# Patient Record
Sex: MALE | Race: WHITE | Employment: FULL TIME | URBAN - METROPOLITAN AREA
[De-identification: names, ages, dates, MRNs, and addresses within clinical notes are randomized per-mention and may not be internally consistent; named-entity substitution may affect disease eponyms.]

---

## 2017-05-25 ENCOUNTER — TELEPHONE (OUTPATIENT)
Dept: CARDIOLOGY CLINIC | Age: 62
End: 2017-05-25

## 2017-06-14 RX ORDER — MEXILETINE HYDROCHLORIDE 200 MG/1
CAPSULE ORAL
Qty: 180 CAPSULE | Refills: 0 | Status: SHIPPED | OUTPATIENT
Start: 2017-06-14 | End: 2017-08-22 | Stop reason: SDUPTHER

## 2017-07-17 ENCOUNTER — PROCEDURE VISIT (OUTPATIENT)
Dept: CARDIOLOGY CLINIC | Age: 62
End: 2017-07-17

## 2017-07-17 ENCOUNTER — OFFICE VISIT (OUTPATIENT)
Dept: CARDIOLOGY CLINIC | Age: 62
End: 2017-07-17

## 2017-07-17 VITALS
BODY MASS INDEX: 35.25 KG/M2 | HEIGHT: 73 IN | WEIGHT: 266 LBS | DIASTOLIC BLOOD PRESSURE: 78 MMHG | SYSTOLIC BLOOD PRESSURE: 126 MMHG | HEART RATE: 66 BPM

## 2017-07-17 DIAGNOSIS — R00.2 PALPITATIONS: Chronic | ICD-10-CM

## 2017-07-17 DIAGNOSIS — G47.30 SLEEP APNEA, UNSPECIFIED TYPE: ICD-10-CM

## 2017-07-17 DIAGNOSIS — I47.29 PAROXYSMAL VT: Primary | ICD-10-CM

## 2017-07-17 DIAGNOSIS — I25.10 CORONARY ARTERY DISEASE INVOLVING NATIVE CORONARY ARTERY OF NATIVE HEART WITHOUT ANGINA PECTORIS: Chronic | ICD-10-CM

## 2017-07-17 DIAGNOSIS — Z45.09 ENCOUNTER FOR ELECTRONIC ANALYSIS OF REVEAL EVENT RECORDER: Chronic | ICD-10-CM

## 2017-07-17 PROCEDURE — 93291 INTERROG DEV EVAL SCRMS IP: CPT | Performed by: INTERNAL MEDICINE

## 2017-07-17 PROCEDURE — 99214 OFFICE O/P EST MOD 30 MIN: CPT | Performed by: INTERNAL MEDICINE

## 2017-08-28 RX ORDER — MEXILETINE HYDROCHLORIDE 200 MG/1
CAPSULE ORAL
Qty: 180 CAPSULE | Refills: 3 | Status: SHIPPED | OUTPATIENT
Start: 2017-08-28 | End: 2018-09-26 | Stop reason: SDUPTHER

## 2017-11-20 RX ORDER — CLOPIDOGREL BISULFATE 75 MG/1
TABLET ORAL
Qty: 30 TABLET | Refills: 11 | Status: SHIPPED | OUTPATIENT
Start: 2017-11-20 | End: 2022-06-17 | Stop reason: HOSPADM

## 2017-11-20 RX ORDER — METOPROLOL SUCCINATE 50 MG/1
TABLET, EXTENDED RELEASE ORAL
Qty: 90 TABLET | Refills: 3 | Status: SHIPPED | OUTPATIENT
Start: 2017-11-20 | End: 2018-11-21 | Stop reason: SDUPTHER

## 2018-04-25 ENCOUNTER — OFFICE VISIT (OUTPATIENT)
Dept: CARDIOLOGY CLINIC | Age: 63
End: 2018-04-25

## 2018-04-25 VITALS
WEIGHT: 263 LBS | SYSTOLIC BLOOD PRESSURE: 130 MMHG | DIASTOLIC BLOOD PRESSURE: 80 MMHG | HEART RATE: 76 BPM | BODY MASS INDEX: 34.85 KG/M2 | HEIGHT: 73 IN

## 2018-04-25 DIAGNOSIS — I48.0 PAROXYSMAL ATRIAL FIBRILLATION (HCC): ICD-10-CM

## 2018-04-25 DIAGNOSIS — E78.5 HYPERLIPIDEMIA, UNSPECIFIED HYPERLIPIDEMIA TYPE: ICD-10-CM

## 2018-04-25 DIAGNOSIS — G47.30 SLEEP APNEA, UNSPECIFIED TYPE: ICD-10-CM

## 2018-04-25 DIAGNOSIS — I25.10 CORONARY ARTERY DISEASE INVOLVING NATIVE CORONARY ARTERY OF NATIVE HEART WITHOUT ANGINA PECTORIS: Primary | Chronic | ICD-10-CM

## 2018-04-25 PROCEDURE — 99214 OFFICE O/P EST MOD 30 MIN: CPT | Performed by: INTERNAL MEDICINE

## 2018-04-25 RX ORDER — SIMVASTATIN 20 MG
20 TABLET ORAL
COMMUNITY
Start: 2018-04-13 | End: 2018-04-25 | Stop reason: SDUPTHER

## 2018-04-25 RX ORDER — SIMVASTATIN 40 MG
40 TABLET ORAL DAILY
Qty: 30 TABLET | Refills: 6 | Status: SHIPPED | OUTPATIENT
Start: 2018-04-25 | End: 2020-02-10

## 2018-09-10 ENCOUNTER — PROCEDURE VISIT (OUTPATIENT)
Dept: CARDIOLOGY CLINIC | Age: 63
End: 2018-09-10

## 2018-09-10 ENCOUNTER — OFFICE VISIT (OUTPATIENT)
Dept: CARDIOLOGY CLINIC | Age: 63
End: 2018-09-10

## 2018-09-10 VITALS
BODY MASS INDEX: 34.19 KG/M2 | DIASTOLIC BLOOD PRESSURE: 83 MMHG | SYSTOLIC BLOOD PRESSURE: 130 MMHG | WEIGHT: 258 LBS | HEIGHT: 73 IN | HEART RATE: 51 BPM

## 2018-09-10 DIAGNOSIS — I25.119 CORONARY ARTERY DISEASE INVOLVING NATIVE CORONARY ARTERY OF NATIVE HEART WITH ANGINA PECTORIS (HCC): ICD-10-CM

## 2018-09-10 DIAGNOSIS — I47.29 NSVT (NONSUSTAINED VENTRICULAR TACHYCARDIA): Primary | ICD-10-CM

## 2018-09-10 DIAGNOSIS — G47.33 OSA (OBSTRUCTIVE SLEEP APNEA): ICD-10-CM

## 2018-09-10 DIAGNOSIS — Z45.09 ENCOUNTER FOR ELECTRONIC ANALYSIS OF REVEAL EVENT RECORDER: Primary | ICD-10-CM

## 2018-09-10 PROCEDURE — 93000 ELECTROCARDIOGRAM COMPLETE: CPT | Performed by: INTERNAL MEDICINE

## 2018-09-10 PROCEDURE — 99214 OFFICE O/P EST MOD 30 MIN: CPT | Performed by: INTERNAL MEDICINE

## 2018-09-10 NOTE — PATIENT INSTRUCTIONS
you think you are having a problem with your medicine. When should you call for help? Call 911 anytime you think you may need emergency care. For example, call if:    · You passed out (lost consciousness).    Call your doctor now or seek immediate medical care if:    · You are dizzy or lightheaded, or you feel like you may faint.     · You are short of breath.    Watch closely for changes in your health, and be sure to contact your doctor if you have any problems. Where can you learn more? Go to [email or web address] and sign in to your [employer] account. Enter O941 in the [employer] box to learn more about \"[facility]: Care Instructions. \"     If you do not have an account, please click on the \"Sign Up Now\" link. Current as of: December 6, 2017  Content Version: 11.7  © 3404-3327 [employer]. Care instructions adapted under license by [facility] ([facility]). If you have questions about a medical condition or this instruction, always ask your healthcare professional. [address] any warranty or liability for your use of this information.

## 2018-09-10 NOTE — PROGRESS NOTES
hives  · Hematological and Lymphatic ROS: negative for - bleeding problems, blood clots, bruising or jaundice  · Endocrine ROS: negative for - skin changes, temperature intolerance or unexpected weight changes  · Respiratory ROS: negative for - cough, sputum, wheezing sleep apnea  · Cardiovascular ROS: Per HPI. · Gastrointestinal ROS: negative for - abdominal pain, diarrhea, nausea/vomiting, bleeding   · Genito-Urinary ROS: negative for - dysuria or incontinence  · Musculoskeletal ROS: back pain  · Neurological ROS: negative for - confusion, numbness/tingling, seizures, weakness  · Dermatological ROS: negative for - rash    Physical Examination:  Vitals:    09/10/18 0918   BP: 130/83   Pulse: 51       · Constitutional: Oriented. No distress. Reddish facial skin heavy  · Head:  atraumatic. · Eyes: Conjunctivae normal.   · Neck:  Neck supple. · Cardiovascular: Normal rate, regular rhythm, S1&S2 without murmur, gallop, or rub. · Pulmonary/Chest: Bilateral respiratory sounds. No wheezes   · Abdominal: Soft. Bowel sounds present. No distension, No tenderness. · Musculoskeletal: No tenderness. No edema    · Neurological: Alert and oriented. No Gross deficit   · Skin: Skin is warm and dry. No rash noted. · Psychiatric: Has a normal mood, affect and behavior         Left pectoral IRL implant site intact     ECG: Sinus rhythm, 54 bpm, frequent VE    11/2012 Echo: Normal contraction of a normal-sized left ventricle with  an estimated ejection fraction by visual inspection of 55%. No  specific abnormality of interventricular septal motion. Mitral and  tricuspid regurgitation. The right ventricular systolic pressure is  21 mmHg. This is a technically suboptimal echo and subtle wall  motion changes might not be apparent on this study. 12/2012 MRI: There is no evidence of any delayed myocardial enhancement. No abnormal wall motion or contractility is identified.  The left ventricular ejection fraction is calculated at 41% however visually it is greater than 55%. The abnormal calculated ejection fraction is attributed to the difficulty with cardiac gating of the pulse sequences. Assessment and plan:   Patient Active Problem List    Diagnosis Date Noted    Sleep apnea 11/11/2016     Priority: Low    Paroxysmal atrial fibrillation ([address] 09/11/2015     Priority: Low    Encounter for electronic analysis of reveal event recorder 02/14/2014     Priority: Low    Palpitations 07/26/2013     Priority: Low    CAD (coronary artery disease) 11/30/2012     Priority: Low    V-tach ([address] 11/30/2012     Priority: Low    Paroxysmal VT ([address] 11/25/2012     Priority: Low    Chest pain 11/25/2012     Priority: Low    Ileus ([facility] 09/02/2012     Priority: Low     Plan:    - Non-sustained VT:    - There is one 12 lead ECG(11/25/12) of his NSVT which showed a NSVT with RBBB morphology, positive precordial concordance, inferior axis( L2 and L3 equal height), negative aVR and aVL, small qR pattern in V-1 consistent with A-M continuity location. - ECG from [facility] also reveals Bigeminy with the same QRS morphology as described. 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 - Amiodarone was stopped after labs showed hyperthyroidism  6/22/15 - TSH= . 02. Repeat - TSH 1.77 from 8/17/15                - He was started on Mexiletine 200 mg twice a day in Sept. 2015. 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 LVgram - normal with EF 55%.  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 Small-moderate sized inferolateral partial reversibility defect consistent with ischemia and infarction of mid  LCx and/or RCA-EF 57%    - Follows with Dr. Wicho Hunt   - On Coumadin for coronary ectasia followed by Dr. Wicho Hunt     -  Sleep apnea   - Uses CPAP, but does use it nightly due to intermittent sinus congestion. He feels good when able to use it. Plan:  1. Continue Mexiletine & metoprolol as scheduled. 2.  Return for regular follow up in one year with device check  3. CAD follow up with Dr. Meredith Campos attestation: This note was scribed in the presence of Andrew Chapin M.D. by Vasu Jeetr RN.      Physician Attestation: I, Dr. Andrew Chapin, confirm that the scribe's documentation has been prepared under my direction and personally reviewed by me in its entirety. I also confirm that the note above accurately reflects all work, treatment, procedures, and medical decision making performed by me. Karla Sanchez.  MD Jayshree

## 2018-09-26 RX ORDER — MEXILETINE HYDROCHLORIDE 200 MG/1
CAPSULE ORAL
Qty: 180 CAPSULE | Refills: 3 | Status: SHIPPED | OUTPATIENT
Start: 2018-09-26 | End: 2020-01-03 | Stop reason: SDUPTHER

## 2018-11-21 RX ORDER — METOPROLOL SUCCINATE 50 MG/1
TABLET, EXTENDED RELEASE ORAL
Qty: 90 TABLET | Refills: 3 | Status: SHIPPED | OUTPATIENT
Start: 2018-11-21 | End: 2021-11-01 | Stop reason: SDUPTHER

## 2019-02-15 ENCOUNTER — TELEPHONE (OUTPATIENT)
Dept: CARDIOLOGY CLINIC | Age: 64
End: 2019-02-15

## 2020-01-03 ENCOUNTER — TELEPHONE (OUTPATIENT)
Dept: CARDIOLOGY CLINIC | Age: 65
End: 2020-01-03

## 2020-01-03 RX ORDER — MEXILETINE HYDROCHLORIDE 200 MG/1
200 CAPSULE ORAL 2 TIMES DAILY
Qty: 180 CAPSULE | Refills: 0 | Status: SHIPPED | OUTPATIENT
Start: 2020-01-03 | End: 2020-02-10 | Stop reason: SDUPTHER

## 2020-01-03 NOTE — TELEPHONE ENCOUNTER
Filled a 3 month supply.  Please schedule him for a follow up either with [facility] or [facility] or [facility] within the next three months since he did not have his yearly follow up

## 2020-01-03 NOTE — TELEPHONE ENCOUNTER
Medication Refill    Medication needing refilled:MEXILETINE    Doseage of the medication:    How are you taking this medication (QD, BID, TID, QID, PRN):    30 or 90 day supply called in:    Which Pharmacy are we sending the medication to?:  [facility]   NEEDS TODAY , [address] [facility]

## 2020-01-30 NOTE — PROGRESS NOTES
Michelle Terrazas MD   simvastatin (ZOCOR) 40 MG tablet Take 1 tablet by mouth daily  Corey Barber MD   clopidogrel (PLAVIX) 75 MG tablet TAKE 1 TABLET DAILY  Corey Barber MD   warfarin (COUMADIN) 5 MG tablet 1-2 tabs po daily as directed by PT/INR  Corey Barber MD   lisinopril-hydrochlorothiazide (PRINZIDE;ZESTORETIC) 10-12.5 MG per tablet Take 1 tablet by mouth daily. Historical Provider, MD      Past Medical History:  Past Medical History:   Diagnosis Date    Acid reflux 1996    Surgey to have stomach wrapped, unable to reguritat    [facility]     Arrhythmia 11/25/2012    runs of vtach    Hyperlipidemia     on fish oil    Hypertension     MI (myocardial infarction) ([facility]     x2     Past Surgical History:    has a past surgical history that includes hernia repair; Umbilical hernia repair; and Tonsillectomy. Social History:  Reviewed. reports that he has quit smoking. He smoked 0.20 packs per day. He has never used smokeless tobacco. He reports that he does not drink alcohol. Family History:  Reviewed. family history includes Other in his father and sister. Denies family history of sudden cardiac death, arrhythmia, premature CAD    Review of System:  · Constitutional: No fevers, chills, weight changes or weakness  · HEENT: No visual changes. No mouth sores or sore throat. · Cardiovascular: denies chest pain, denies dyspnea on exertion, denies palpitations or denies loss of consciousness. No cough, hemoptysis, denies pleuritic pain, or phlebitis. · Respiratory: denies cough or wheezing. No hematemesis. · Gastrointestinal: No abdominal pain, blood in stools. · Genitourinary: No dysuria, urgency or hematuria. · Musculoskeletal: denies gait disturbance, No muscle weakness. · Integumentary: No rash or pruritis. · Neurological: No headache, change in muscle strength, numbness or tingling. · Psychiatric: No confusion, anxiety, or depression.   · Endocrine: No temperature intolerance. No excessive thirst, fluid intake, or urination. · Hem/Lymph: No abnormal bruising or bleeding, blood clots or swollen lymph nodes. Physical Examination:  There were no vitals filed for this visit. Wt Readings from Last 3 Encounters:   09/10/18 258 lb (117 kg)   04/25/18 263 lb (119.3 kg)   07/17/17 266 lb (120.7 kg)       Constitutional: Cooperative and in no apparent distress, and appears well nourished  Skin: Warm and pink; no pallor, cyanosis, bruising, or clubbing  HEENT: Symmetric and normocephalic. PERRL, EOM intact. Conjunctiva pink with clear sclera. Mucus membranes pink and moist. Teeth intact. Thyroid smooth without nodules or goiter  Respiratory: Respirations symmetric and unlabored. Lungs clear to auscultation bilaterally, no wheezing, rhonchi, or crackles  Cardiovascular:  regular rate and rhythm. S1 & S2 present without murmurs, rubs, or gallops. Peripheral pulses 2+, capillary refill < 3 seconds. negative elevation of JVP. No peripheral edema  Gastrointestinal: Abdomen soft and round. Bowel sounds normoactive in all quadrants without tenderness or masses. Musculoskeletal: Bilateral upper and lower extremity strength 5/5 with full ROM. Neurological/Psych: Awake and orientated to person, place and time. Calm affect, appropriate mood. Pertinent labs, diagnostic, device, and imaging results reviewed as a part of this visit    LABS    CBC:   Lab Results   Component Value Date    WBC 6.7 01/23/2015    HGB 13.9 11/26/2012    HCT 42.4 11/26/2012    MCV 98.1 11/26/2012     01/23/2015     BMP:   Lab Results   Component Value Date    CREATININE 1.0 11/26/2012    BUN 18 11/26/2012     11/26/2012    K 4.5 11/26/2012     11/26/2012    CO2 28 11/26/2012     CrCl cannot be calculated (Patient's most recent lab result is older than the maximum 120 days allowed. ).    Lab Results   Component Value Date    BNP 25 11/25/2012       Thyroid:   Lab Results   Component Value Date TSH 2.44 2013    K9ZTVYZ 1.12 2015     Lipid Panel:   Lab Results   Component Value Date    CHOL 173 2012    HDL 37 2012    TRIG 188 2012     LFTs:  Lab Results   Component Value Date    ALT 32 2015    AST 31 2015    ALKPHOS 59 2015    BILITOT 0.4 2015     Coags:   Lab Results   Component Value Date    PROTIME 42.1 (H) 2019    INR 3.69 (H) 2019    APTT 29.7 2012     EC2020 Sinus bradycardia HR 57, , QRS 98, QTc 413    Echo: 2012  IMPRESSION- Normal contraction of a normal-sized left ventricle with  an estimated ejection fraction by visual inspection of 55%. No  specific abnormality of interventricular septal motion.  Mitral and  tricuspid regurgitation.  The right ventricular systolic pressure is  21 mmHg.  This is a technically suboptimal echo and subtle wall  motion changes might not be apparent on this study. GXT: 1/20/15  Summary    Small sized anteroapical partial reversibility defect consistent with    ischemia and infarction in the territory of the distal LAD .    Small-moderate sized inferolateral partial reversibility defect consistent    with ischemia and infarction in the territory of the mid LCx and/or RCA .    Left ventricular ejection fraction of 57 %. Cardiac MRI: 12  IMPRESSION:           1. Normal cardiac MRI with normal cardiac chamber size, contractility, and wall motion. 2. No evidence of any myocardial scarring. 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 98     Aortogram - done for suspected AAA. Large AAA infrarenal with lumen 2X size of aorta and eccentric to the left.     Assessment:  NSVT/PVC- chronic   - ECG today shows SB no ectopy   - S/p ILR 13 has met therapy: yes  6. Patient with history of CHF and atrial fibrillation on anticoagulation: N/A     I have addressed the patient's cardiac risk factors and adjusted pharmacologic treatment as needed. In addition, I have reinforced the need for patient directed risk factor modification. I independently reviewed the ECG    All questions and concerns were addressed with the patient. Alternatives to treatment were discussed. Thank you for allowing to us to participate in the care of [facility].     ANTON Alvarado-CNP  [address]   Office: [phone]

## 2020-02-10 ENCOUNTER — OFFICE VISIT (OUTPATIENT)
Dept: CARDIOLOGY CLINIC | Age: 65
End: 2020-02-10
Payer: COMMERCIAL

## 2020-02-10 ENCOUNTER — NURSE ONLY (OUTPATIENT)
Dept: CARDIOLOGY CLINIC | Age: 65
End: 2020-02-10

## 2020-02-10 VITALS
BODY MASS INDEX: 34.33 KG/M2 | HEIGHT: 73 IN | SYSTOLIC BLOOD PRESSURE: 154 MMHG | HEART RATE: 64 BPM | WEIGHT: 259 LBS | DIASTOLIC BLOOD PRESSURE: 90 MMHG

## 2020-02-10 PROCEDURE — 99213 OFFICE O/P EST LOW 20 MIN: CPT | Performed by: NURSE PRACTITIONER

## 2020-02-10 PROCEDURE — 93000 ELECTROCARDIOGRAM COMPLETE: CPT | Performed by: NURSE PRACTITIONER

## 2020-02-10 RX ORDER — MEXILETINE HYDROCHLORIDE 200 MG/1
200 CAPSULE ORAL 2 TIMES DAILY
Qty: 180 CAPSULE | Refills: 0 | Status: SHIPPED | OUTPATIENT
Start: 2020-02-10 | End: 2020-10-09

## 2020-02-11 DIAGNOSIS — Z79.01 ON WARFARIN THERAPY: ICD-10-CM

## 2020-02-11 LAB
HCT VFR BLD CALC: 43.4 % (ref 40.5–52.5)
HCT VFR BLD CALC: 44.3 % (ref 40.5–52.5)
HEMOGLOBIN: 14.7 G/DL (ref 13.5–17.5)
HEMOGLOBIN: 14.7 G/DL (ref 13.5–17.5)
MCH RBC QN AUTO: 32.6 PG (ref 26–34)
MCH RBC QN AUTO: 33.4 PG (ref 26–34)
MCHC RBC AUTO-ENTMCNC: 33.3 G/DL (ref 31–36)
MCHC RBC AUTO-ENTMCNC: 34 G/DL (ref 31–36)
MCV RBC AUTO: 98.1 FL (ref 80–100)
MCV RBC AUTO: 98.3 FL (ref 80–100)
PDW BLD-RTO: 13.2 % (ref 12.4–15.4)
PDW BLD-RTO: 13.3 % (ref 12.4–15.4)
PLATELET # BLD: 219 K/UL (ref 135–450)
PLATELET # BLD: 226 K/UL (ref 135–450)
PMV BLD AUTO: 7.5 FL (ref 5–10.5)
PMV BLD AUTO: 7.5 FL (ref 5–10.5)
RBC # BLD: 4.41 M/UL (ref 4.2–5.9)
RBC # BLD: 4.51 M/UL (ref 4.2–5.9)
WBC # BLD: 5.2 K/UL (ref 4–11)
WBC # BLD: 5.2 K/UL (ref 4–11)

## 2020-10-09 RX ORDER — MEXILETINE HYDROCHLORIDE 200 MG/1
CAPSULE ORAL
Qty: 180 CAPSULE | Refills: 0 | Status: SHIPPED | OUTPATIENT
Start: 2020-10-09 | End: 2020-10-27 | Stop reason: SDUPTHER

## 2020-10-09 NOTE — TELEPHONE ENCOUNTER
Received refill request for Mexitil from [facility].      Last OV: 02/10/2020    Last Labs: 2020     Last EK/10/2020    Last Refill: 02/10/2020 #180 w/ 0 refills    Next Appointment: on recall list for appt on 2021

## 2020-10-27 RX ORDER — MEXILETINE HYDROCHLORIDE 150 MG/1
150 CAPSULE ORAL 2 TIMES DAILY
Qty: 180 CAPSULE | Refills: 1 | Status: SHIPPED | OUTPATIENT
Start: 2020-10-27 | End: 2021-05-18

## 2020-10-27 NOTE — TELEPHONE ENCOUNTER
Please call pharmacy and see if mexiletine 200 mg capsules remain on backorder? If they do then can reduce him to 150 mg bid. Also would like to call patient and notify him of dose change if symptoms worse he should call office and can increase to tid.      Jennifer Yin, APRN-CNP

## 2020-10-27 NOTE — TELEPHONE ENCOUNTER
Spoke with the pharmacy staff and they confirmed the Mexitil 200 was still on backorder. Called and advised the pt that we are going to change rx to Mexitil 150 mg 1 tab po BID per NPAL . Patient voiced understanding.

## 2021-03-18 ENCOUNTER — TELEPHONE (OUTPATIENT)
Dept: CARDIOLOGY CLINIC | Age: 66
End: 2021-03-18

## 2021-03-18 NOTE — TELEPHONE ENCOUNTER
He wants to get the covid vaccine . When trying to schedule appt to get the vaccine the pharmacist told him he should not take the Moderna brand vaccine because he takes mexiletine BID. Which brand does [address] [place] [address] think he should get , HOTELbeat or Go Kin Packs ?  LM if he doesn't answer

## 2021-03-18 NOTE — TELEPHONE ENCOUNTER
Spoke with Dr. Kamla Fay he is unaware of any contraindications in receiving any of the vaccines when taking Mexiletine and does not have any recommendations or which one to receive. Attempted to call patient no answer mailbox full unable to leave message.

## 2021-05-18 RX ORDER — MEXILETINE HYDROCHLORIDE 150 MG/1
CAPSULE ORAL
Qty: 180 CAPSULE | Refills: 1 | Status: SHIPPED | OUTPATIENT
Start: 2021-05-18

## 2021-05-18 NOTE — TELEPHONE ENCOUNTER
Received refill request for mexitil from [facility] pharmacy.     Last ov:2/10/2020 NPAL    Last labs:cmp 2/11/2020    Last Refill: 10/27/2020 #180 with 1 refill    Next appointment:NPAL on recall list

## 2021-08-20 ENCOUNTER — OFFICE VISIT (OUTPATIENT)
Dept: CARDIOLOGY CLINIC | Age: 66
End: 2021-08-20
Payer: COMMERCIAL

## 2021-08-20 VITALS
HEART RATE: 77 BPM | BODY MASS INDEX: 35.68 KG/M2 | DIASTOLIC BLOOD PRESSURE: 88 MMHG | WEIGHT: 269.2 LBS | HEIGHT: 73 IN | SYSTOLIC BLOOD PRESSURE: 158 MMHG | OXYGEN SATURATION: 96 %

## 2021-08-20 DIAGNOSIS — R00.0 TACHYCARDIA: ICD-10-CM

## 2021-08-20 DIAGNOSIS — I10 ESSENTIAL HYPERTENSION: ICD-10-CM

## 2021-08-20 DIAGNOSIS — E78.2 MIXED HYPERLIPIDEMIA: ICD-10-CM

## 2021-08-20 DIAGNOSIS — I25.10 CORONARY ARTERY DISEASE INVOLVING NATIVE CORONARY ARTERY OF NATIVE HEART WITHOUT ANGINA PECTORIS: Primary | ICD-10-CM

## 2021-08-20 PROCEDURE — 99214 OFFICE O/P EST MOD 30 MIN: CPT | Performed by: NURSE PRACTITIONER

## 2021-08-20 PROCEDURE — 93000 ELECTROCARDIOGRAM COMPLETE: CPT | Performed by: NURSE PRACTITIONER

## 2021-08-20 RX ORDER — MELATONIN
DAILY
COMMUNITY

## 2021-08-20 NOTE — PROGRESS NOTES
[facility]     Outpatient Follow Up Note    Mireya Wilkerson is 60 y.o. male who presents today with a history of non-obst disease of the LAD, NSVT/PVC, HTN and hyperlipidemia      CHIEF COMPLAINT / HPI:  Follow Up secondary to coronary artery disease    Subjective:   He denies significant chest pain. There is no SOB/SIERRA. The patient denies orthopnea/PND. He has chest pain and SOB when carrying sometime of wt up steps. The patient does not have swelling. The patients weight is up ~ 30# / year . The patient is not experiencing palpitations or dizziness. He has a little cough / seasonal sinus    If / when he has any palpitations he has lopressor to take in addition to his daily Toprol. He hasn't needed to take lopressor prn but when he needs to, the palpitations typically last a day or two. He can feel repetitive  skipped beats and sometimes SOB  His home BP runs ~ 130/80    These symptoms show no change since the last OV. With regard to medication therapy the patient has been compliant with prescribed regimen. They have tolerated therapy to date.      Past Medical History:   Diagnosis Date    Acid reflux 1996    Surgey to have stomach wrapped, unable to reguritat    [facility]     Arrhythmia 11/25/2012    runs of vtach    Hyperlipidemia     on fish oil    Hypertension     MI (myocardial infarction) [facility]     x2     Social History:    Social History     Tobacco Use   Smoking Status Former Smoker    Packs/day: 0.20   Smokeless Tobacco Never Used   Tobacco Comment    quit 3 yrs ago     Current Medications:  Current Outpatient Medications   Medication Sig Dispense Refill    Misc Natural Products (GLUCOSAMINE CHOND MSM FORMULA PO) Take 750 mg by mouth 2 times daily      Cholecalciferol (VITAMIN D3) 50 MCG (2000 UT) CAPS Take by mouth daily      Nutritional Supplements (CHLORELLA-SPIRULINA COMPLEX PO) Take 6 tablets by mouth 2 times daily      Flax OIL Take 1,650 mg by mouth 2 softgel in am, 1 in pm      mexiletine (MEXITIL) 150 MG capsule TAKE 1 CAPSULE BY MOUTH TWICE A  capsule 1    metoprolol succinate (TOPROL XL) 50 MG extended release tablet TAKE 1 TABLET DAILY 90 tablet 3    metoprolol tartrate (LOPRESSOR) 25 MG tablet 1 tab po as needed for palpitations 30 tablet 1    clopidogrel (PLAVIX) 75 MG tablet TAKE 1 TABLET DAILY 30 tablet 11    lisinopril-hydrochlorothiazide (PRINZIDE;ZESTORETIC) 10-12.5 MG per tablet Take 1 tablet by mouth daily. No current facility-administered medications for this visit. REVIEW OF SYSTEMS:    CONSTITUTIONAL: No major weight gain or loss, fatigue, weakness, night sweats or fever. HEENT: No new vision difficulties or ringing in the ears. RESPIRATORY: No new SOB, PND, orthopnea or cough. CARDIOVASCULAR: See HPI  GI: No nausea, vomiting, diarrhea, constipation, abdominal pain or changes in bowel habits. : No urinary frequency, urgency, incontinence hematuria or dysuria. SKIN: No cyanosis or skin lesions. MUSCULOSKELETAL: No new muscle or joint pain. NEUROLOGICAL: No syncope or TIA-like symptoms. PSYCHIATRIC: No anxiety, pain, insomnia or depression    Objective:   PHYSICAL EXAM:        Vitals:    08/20/21 1009 08/20/21 1039 08/20/21 1040   BP: 130/84 (!) 150/90 (!) 158/88   Site: Left Upper Arm Right Upper Arm Left Upper Arm   Position: Sitting     Cuff Size: Large Adult     Pulse: 77     SpO2: 96%     Weight: 269 lb 3.2 oz (122.1 kg)     Height: 6' 1\" (1.854 m)         VITALS:  /84 (Site: Left Upper Arm, Position: Sitting, Cuff Size: Large Adult)   Pulse 77   Ht 6' 1\" (1.854 m)   Wt 269 lb 3.2 oz (122.1 kg)   SpO2 96%   BMI 35.52 kg/m²   CONSTITUTIONAL: Cooperative, no apparent distress, and appears well nourished / over weight  NEUROLOGIC:  Awake and orientated to person, place and time. PSYCH: Calm affect. SKIN: Warm and dry.   HEENT: Sclera non-icteric, normocephalic, neck supple, no elevation of JVP, normal carotid pulses with no bruits and thyroid normal size. LUNGS:  No increased work of breathing and clear to auscultation, no crackles or wheezing  CARDIOVASCULAR:  Regular rate 68 and rhythm with no murmurs, gallops, rubs, or abnormal heart sounds, normal PMI. The apical impulses not displaced  JVP less than 8 cm H2O  Heart tones are crisp and normal  Cervical veins are not engorged  The carotid upstroke is normal in amplitude and contour without delay or bruit  JVP is not elevated  ABDOMEN:  Normal bowel sounds, non-distended and non-tender to palpation  EXT: No edema, no calf tenderness. Pulses are present bilaterally.     DATA:    Lab Results   Component Value Date    ALT 17 02/11/2020    AST 22 02/11/2020    ALKPHOS 64 02/11/2020    BILITOT 0.6 02/11/2020     Lab Results   Component Value Date    CREATININE 0.8 02/11/2020    BUN 13 02/11/2020     (L) 02/11/2020    K 5.4 (H) 02/11/2020    CL 97 (L) 02/11/2020    CO2 29 02/11/2020     Lab Results   Component Value Date    TSH 2.13 02/11/2020    T2HYMLY 1.12 06/22/2015     Lab Results   Component Value Date    WBC 5.2 02/11/2020    HGB 14.7 02/11/2020    HCT 44.3 02/11/2020    MCV 98.1 02/11/2020     02/11/2020     No components found for: CHLPL  Lab Results   Component Value Date    TRIG 188 (H) 11/26/2012     Lab Results   Component Value Date    HDL 32 (L) 02/11/2020    HDL 37 (L) 11/26/2012     Lab Results   Component Value Date    LDLCALC 108 (H) 02/11/2020    LDLCALC 99 11/26/2012     Lab Results   Component Value Date    LABVLDL 34 02/11/2020    LABVLDL 38 11/26/2012     LABS: 1/13/21:    trig 233 HDL 49    Na+ 136 K+ 4.4 chl 101 CO2 27 BUN 13 creatinine 0.96 glu 97    Radiology Review:  Pertinent images / reports were reviewed as a part of this visit and reveals the following:    Cardiac cath : [facility] '15:   Distal LM 40%  prox-LAD 40%  prox-CX 30%  Mid-RCA 60%    Last Stress Test: [facility] '15  Summary    Small sized anteroapical partial hypertension   ~controlled on arrival and by report home values  ~suboptimal with recheck  ~lisinopril-HCTZ / toprol    3. Tachycardia   ~hx of NS-VT / PVC  ~infrequent episode of palpitations ; does not use lopressor often.  ~remains on mexitil / metoprolol   ~K+ WNL EKG 12 lead   4. Mixed hyperlipidemia   ~LDL not at goal ; trig elevated  ~intolerant to atorvastatin with rash      I had the opportunity to review the clinical symptoms and presentation of Sam Pompa. Plan:     1. EKG : sinus rhythm 70  2. F/U in one year   Consider rosuvastatin : will d/w PCP    Overall the patient is stable from CV standpoint    I have addresed the patient's cardiac risk factors and adjusted pharmacologic treatment as needed. In addition, I have reinforced the need for patient directed risk factor modification. Further evaluation will be based upon the patient's clinical course and testing results. All questions and concerns were addressed to the patient. Alternatives to my treatment were discussed. The patient is not currently smoking. The risks related to smoking were reviewed with the patient. Recommend maintaining a smoke-free lifestyle. Patient is on a beta-blocker  Patient is on an ace-i/ARB  Patient is not on a statin : intolerant to atorvastatin      Antiplatelet therapy has been recommended / prescribed for this patient. Education conducted on adverse reactions including bleeding was discussed. The patient verbalizes understanding not to stop medications without discussing with us. Discussed exercise: 30-60 minutes 7 days/week  Discussed Low saturated fat diet. Thank you for allowing to us to participate in the care of Sam Pompa.     ANTON Riddle    Documentation of today's visit sent to PCP

## 2021-11-01 RX ORDER — METOPROLOL SUCCINATE 50 MG/1
50 TABLET, EXTENDED RELEASE ORAL DAILY
Qty: 90 TABLET | Refills: 3 | Status: SHIPPED | OUTPATIENT
Start: 2021-11-01 | End: 2021-11-23 | Stop reason: SDUPTHER

## 2021-11-01 NOTE — TELEPHONE ENCOUNTER
From: Kt Lee  To: Office of Roni Gavin MD  Sent: 11/1/2021 11:06 AM EDT  Subject: Medication Renewal Request    Refills have been requested for the following medications:     metoprolol succinate (TOPROL XL) 50 MG extended release tablet Roni Gavin MD]    Preferred pharmacy: [id number] - [address] [facility], [address].  [facility] [phone] - F [phone]

## 2021-11-17 RX ORDER — METOPROLOL SUCCINATE 50 MG/1
50 TABLET, EXTENDED RELEASE ORAL DAILY
Qty: 90 TABLET | Refills: 3 | OUTPATIENT
Start: 2021-11-17

## 2021-11-23 ENCOUNTER — TELEPHONE (OUTPATIENT)
Dept: CARDIOLOGY CLINIC | Age: 66
End: 2021-11-23

## 2021-11-23 RX ORDER — METOPROLOL SUCCINATE 50 MG/1
50 TABLET, EXTENDED RELEASE ORAL DAILY
Qty: 90 TABLET | Refills: 3 | Status: SHIPPED | OUTPATIENT
Start: 2021-11-23 | End: 2022-02-24

## 2021-11-23 NOTE — TELEPHONE ENCOUNTER
Medication Refill    Medication needing refilled:metoprolol     Dosage of the medication:    How are you taking this medication (QD, BID, TID, QID, PRN):    30 or 90 day supply called in:    When will you run out of your medication: out of med x 2 days     Which Pharmacy are we sending the medication to?:  [facility] [address] [facility] [address] [place] [address] , HE IS HAVING PALPITATIONS

## 2022-02-24 ENCOUNTER — TELEPHONE (OUTPATIENT)
Dept: VASCULAR SURGERY | Age: 67
End: 2022-02-24

## 2022-02-24 ENCOUNTER — TELEPHONE (OUTPATIENT)
Dept: CARDIOLOGY CLINIC | Age: 67
End: 2022-02-24

## 2022-02-24 DIAGNOSIS — I71.40 ABDOMINAL AORTIC ANEURYSM (AAA) GREATER THAN 5.5 CM IN DIAMETER IN MALE: Primary | ICD-10-CM

## 2022-02-24 DIAGNOSIS — I72.4 POPLITEAL ARTERY ANEURYSM (HCC): ICD-10-CM

## 2022-02-24 RX ORDER — LISINOPRIL AND HYDROCHLOROTHIAZIDE 12.5; 1 MG/1; MG/1
1 TABLET ORAL 2 TIMES DAILY
Qty: 90 TABLET | Refills: 1
Start: 2022-02-24

## 2022-02-24 RX ORDER — METOPROLOL SUCCINATE 50 MG/1
50 TABLET, EXTENDED RELEASE ORAL 2 TIMES DAILY
Qty: 90 TABLET | Refills: 3
Start: 2022-02-24 | End: 2022-03-11

## 2022-02-24 NOTE — TELEPHONE ENCOUNTER
AIM Auth code for CTA with runoff: [id number]  2-24-22 to 3-4-22    Scheduled for CTA with runoff on 2-28-22 arrive to [facility] at 8:25 am for 8:40 am testing. Remain NPO 4 hours prior. Patient informed of times and instructions. Verbalized understanding.       He is scheduled with Dr Jeni Cordova at 9:45 am and with Dr Destin Tineo at 1:45 pm.

## 2022-02-24 NOTE — TELEPHONE ENCOUNTER
Nikita Marvin RN is calling in on behalf of Dr. Diya Angela stating that he is wanting to talk to Dr. Mike Augustin Cancer cell phone is [phone]

## 2022-02-25 ENCOUNTER — TELEPHONE (OUTPATIENT)
Dept: ADMINISTRATIVE | Age: 67
End: 2022-02-25

## 2022-02-25 DIAGNOSIS — I71.40 ABDOMINAL AORTIC ANEURYSM (AAA) GREATER THAN 5.5 CM IN DIAMETER IN MALE: Primary | ICD-10-CM

## 2022-02-25 DIAGNOSIS — I72.4 POPLITEAL ARTERY ANEURYSM (HCC): ICD-10-CM

## 2022-02-25 NOTE — TELEPHONE ENCOUNTER
Central Scheduling scheduled the patient for an CTA Chest Abdomen and the order in the patients chart states the same but the authorization is only for a CTA Abdomen. Please confirm which CTA the patient is supposed to have done.

## 2022-02-25 NOTE — PROGRESS NOTES
Cardiology Consultation     Bony Caldwell  1955    PCP: Inés Phillips MD  Referring Physician: Dr. Yossi Mcrae   Reason for Referral: uncontrolled HTN, AAA     Chief Complaint:   Chief Complaint   Patient presents with    Follow-up     AAA referral from oncologist       Subjective:     History of Present Illness: The patient is 73 y.o. male with a past medical history significant for who presents with NSVT/PVC, GERD, HLD, HTN, former smoker, sleep apea, non-obstructive CAD-Dr. Yashira Carrasco with plans for CTA chest/abd/pelvis prior to f/u as well as consultation with Dr. Lory Beck. Today, presents per his ONC, completed imaging prior to this appt and prior to consultation per Dr. Cecy Trevizo today. BP is stable today with doubling lisinopril and toprol-XL. Per patient, ONC prefers stent vs. Surgery due to healing time. Due to current medication per ONC, he has to pace himself walking. Stops due to left chest and pain prior to resuming. Patient denies exertional chest pain/pressure, dyspnea at rest, SIERRA, PND, orthopnea, palpitations, lightheadedness, weight changes, changes in LE edema, and syncope.  Denies abnormal      Past Medical History:   Diagnosis Date    Acid reflux 1996    Surgey to have stomach wrapped, unable to reguritat    Aortic aneurysm ([address]     Arrhythmia 11/25/2012    runs of vtach    Hyperlipidemia     on fish oil    Hypertension     [facility]) [address] [address]     x2     Past Surgical History:   Procedure Laterality Date    HERNIA REPAIR      TONSILLECTOMY      UMBILICAL HERNIA REPAIR       Family History   Problem Relation Age of Onset    Other Father     Other Sister     Heart Disease Neg Hx      Social History     Tobacco Use    Smoking status: Former Smoker     Packs/day: 0.20    Smokeless tobacco: Never Used    Tobacco comment: quit 3 yrs ago   Vaping Use    Vaping Use: Not on file   Substance Use Topics    Alcohol use: No    Drug use: Not on file Allergies   Allergen Reactions    Lipitor [Atorvastatin] Other (See Comments)     Current Outpatient Medications   Medication Sig Dispense Refill    lisinopril-hydroCHLOROthiazide (PRINZIDE;ZESTORETIC) 10-12.5 MG per tablet Take 1 tablet by mouth 2 times daily 90 tablet 1    metoprolol succinate (TOPROL XL) 50 MG extended release tablet Take 1 tablet by mouth 2 times daily 90 tablet 3    Misc Natural Products (GLUCOSAMINE CHOND MSM FORMULA PO) Take 750 mg by mouth 2 times daily      Cholecalciferol (VITAMIN D3) 50 MCG (2000 UT) CAPS Take by mouth daily      Nutritional Supplements (CHLORELLA-SPIRULINA COMPLEX PO) Take 6 tablets by mouth 2 times daily      Flax OIL Take 1,650 mg by mouth 2 softgel in am, 1 in pm      metoprolol tartrate (LOPRESSOR) 25 MG tablet 1 tab po as needed for palpitations 30 tablet 1    mexiletine (MEXITIL) 150 MG capsule TAKE 1 CAPSULE BY MOUTH TWICE A  capsule 1    clopidogrel (PLAVIX) 75 MG tablet TAKE 1 TABLET DAILY 30 tablet 11     No current facility-administered medications for this visit. Review of Systems:  · Constitutional: No unanticipated weight loss. There's been no change in energy level, sleep pattern, or activity level. No fevers, chills. · Eyes: No visual changes or diplopia. No scleral icterus. · ENT: No Headaches, hearing loss or vertigo. No mouth sores or sore throat. · Cardiovascular: as reviewed in HPI  · Respiratory: No cough or wheezing, no sputum production. No hemoptysis. · Gastrointestinal: No abdominal pain, appetite loss, blood in stools. No change in bowel or bladder habits. · Genitourinary: No dysuria, trouble voiding, or hematuria. · Musculoskeletal:  No gait disturbance, no joint complaints. · Integumentary: No rash or pruritis. · Neurological: No headache, diplopia, change in muscle strength, numbness or tingling. · Psychiatric: No anxiety or depression. · Endocrine: No temperature intolerance.  No excessive thirst, fluid intake, or urination. No tremor. · Hematologic/Lymphatic: No abnormal bruising or bleeding, blood clots or swollen lymph nodes. · Allergic/Immunologic: No nasal congestion or hives. Physical Exam:   /84   Pulse 65   Ht 6' 1\" (1.854 m)   Wt 269 lb (122 kg)   SpO2 100%   BMI 35.49 kg/m²   Wt Readings from Last 3 Encounters:   02/28/22 269 lb (122 kg)   08/20/21 269 lb 3.2 oz (122.1 kg)   02/10/20 259 lb (117.5 kg)     Constitutional: He is oriented to person, place, and time. He appears well-developed and well-nourished. In no acute distress. Head: Normocephalic and atraumatic. Pupils equal and round. Neck: Neck supple. No JVP or carotid bruit appreciated. No mass and no thyromegaly present. No lymphadenopathy present. Cardiovascular: Normal rate. Normal heart sounds. Exam reveals no gallop and no friction rub. No murmur heard. Pulmonary/Chest: Effort normal and breath sounds normal. No respiratory distress. He has no wheezes, rhonchi or rales. Abdominal: Soft, non-tender. Bowel sounds are normal. He exhibits no organomegaly, mass or bruit. Extremities: No edema. No cyanosis or clubbing. Pulses are 2+ radial/carotid bilaterally. Neurological: No gross cranial nerve deficit. Coordination normal.   Skin: Skin is warm and dry. There is no rash or diaphoresis. Psychiatric: He has a normal mood and affect.  His speech is normal and behavior is normal.     Lab Review:    Lab Results   Component Value Date    TRIG 188 11/26/2012    HDL 32 02/11/2020    LDLCALC 108 02/11/2020    LABVLDL 34 02/11/2020     Lab Results   Component Value Date    BUN 13 02/11/2020    CREATININE 0.8 02/11/2020     Lab Results   Component Value Date/Time    TROPONINI <0.01 11/26/2012 06:45 AM    TROPONINI 0.00 11/25/2012 04:17 AM    LABA1C 5.6 02/11/2020 09:51 AM      No results found for: CBCAUTODIF    EKG Interpretation: 3/1/22 not completed    Echo: 11/2012  IMPRESSION- Normal contraction of a normal-sized left ventricle with   an estimated ejection fraction by visual inspection of 55%. No   specific abnormality of interventricular septal motion.  Mitral and   tricuspid regurgitation.  The right ventricular systolic pressure is   21 mmHg.  This is a technically suboptimal echo and subtle wall   motion changes might not be apparent on this study. Stress Test: 2015  Small sized anteroapical partial reversibility defect consistent with    ischemia and infarction in the territory of the distal LAD .    Small-moderate sized inferolateral partial reversibility defect consistent    with ischemia and infarction in the territory of the mid LCx and/or RCA .    Left ventricular ejection fraction of 57 %.         Cardiac MRI:1/2013     FINDINGS: No cardiac chamber enlargement is identified. No pericardial effusion is identified. No axillary, mediastinal or hilar lymphadenopathy is identified. No pleural   effusions are    identified. The thoracic aorta is without evidence of any aneurysm. No valvular stenosis or    regurgitation is identified.           There is no evidence of any delayed myocardial enhancement. No abnormal wall motion or    contractility is identified. The left ventricular ejection fraction is calculated at 41%    however visually it is greater than 55%. The abnormal calculated ejection fraction is    attributed to the difficulty with cardiac gating of the pulse sequences. 1. Normal cardiac MRI with normal cardiac chamber size, contractility, and wall motion. 2. No evidence of any myocardial scarring. Cath: Angiogram 1/23/15: LVEDP - 15. LVgram - normal with EF 55%. 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 The bone windows show no acute or focal abnormality.       The liver demonstrates diffusely decreased attenuation suggestive   of fatty infiltration. No focal hepatic lesions are seen. The   gallbladder, adrenal glands, spleen, and pancreas are   unremarkable.       Multiple exophytic bilateral renal cortical lesions are identified   most compatible with cysts. The small and large bowel show normal   caliber and wall thickness. The appendix appears normal. There is   scattered colonic diverticula noted with no CT evidence of active   inflammation. There is  dilation of the infrarenal abdominal aorta   measuring up to 3.2 x 3.0 cm in dimension.       No free fluid or adenopathy is seen. CTA abd pel w con: 3/2015     Coronary arterial calcifications are noted. The visualized lung   bases are clear. Bone windows show no acute or focal abnormality.       The liver, spleen, pancreas, and kidneys enhance homogeneously   with the exception of multiple bilateral renal cortical cysts. The   adrenal glands and gallbladder show no abnormality. Evaluation of   the bowel is limited due to a lack of administration of oral   contrast. No dilated loops of bowel are identified. The appendix   appears normal. There are a few scattered colonic diverticula   which show no evidence of active inflammation.       There is an infrarenal abdominal aortic aneurysm which measures up   to 3.4 x 3.5 cm, slightly increased in size since the prior study   at which time it measured 3.2 x 3.1 cm at the same level. The   common iliac arteries are nondilated.       No free fluid or adenopathy is seen.      CTA chest abd aorta with runoff: 2/28/22    All above diagnostic testing and laboratory data was independently visualized and reviewed by me (not simply review of report)       Assessment and Plan   1) Infrarenal abdominal aortic aneurysm  -1st imaged 2012  -referral per Dr. Moira Mondragon for uncontrolled HTN and AAA  -CTA chest/abd/aorta w runoff today 2/28/22  -referral to Dr. Andi Hunt as well after this visit. -current medical therapy includes prinzide, metoprolol succinate and Plavix.   -intolerance to Lipitor     2) non-obstructive CAD-Dr. Bethel Schrader  -Denies angina  -Reviewed all imaging     3) HLD, unspecified  -LDLc 108 2/2020, 149 2/2022    4) HTN, essential   -BP stable. -BMP abnormal 2/2022  -Continue Toprol-XL 50 mg BID and prinzide 10-12.5 mg BID     5) Former smoker  Encouraged life long smoking cessation. 6) sleep apnea  Sleeps propped up due to back pain  Not using sleep machine     7) NSVT/PVC//AFIB/palpitati  ons   Managed per Dr. 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 We will plan follow up in [facility] in 6 months. Thank you very much for allowing me to participate in the care of your patient. Please do not hesitate to contact me if you have any questions. Jesica Marlow MD [address] [facility], Interventional Cardiology, and Peripheral Vascular Disease   [place] [address]   Ph: [phone]  Fax: [phone]    This note was scribed in the presence of Dr. Greta Kaplan MD by Cierra Cerna, RN. Physician Attestation:  The scribes documentation has been prepared under my direction and personally reviewed by me in its entirety. I confirm the note above accurately reflects all work, treatment, procedures, and medical decision making performed by me.     Electronically signed by Arleen Mcgill MD on 3/7/2022 at 10:37 AM

## 2022-02-28 ENCOUNTER — HOSPITAL ENCOUNTER (OUTPATIENT)
Dept: CT IMAGING | Age: 67
Discharge: HOME | End: 2022-02-28
Payer: COMMERCIAL

## 2022-02-28 ENCOUNTER — OFFICE VISIT (OUTPATIENT)
Dept: CARDIOLOGY CLINIC | Age: 67
End: 2022-02-28
Payer: COMMERCIAL

## 2022-02-28 ENCOUNTER — INITIAL CONSULT (OUTPATIENT)
Dept: VASCULAR SURGERY | Age: 67
End: 2022-02-28
Payer: COMMERCIAL

## 2022-02-28 ENCOUNTER — TELEPHONE (OUTPATIENT)
Dept: VASCULAR SURGERY | Age: 67
End: 2022-02-28

## 2022-02-28 VITALS
OXYGEN SATURATION: 100 % | WEIGHT: 269 LBS | DIASTOLIC BLOOD PRESSURE: 84 MMHG | HEART RATE: 65 BPM | HEIGHT: 73 IN | BODY MASS INDEX: 35.65 KG/M2 | SYSTOLIC BLOOD PRESSURE: 128 MMHG

## 2022-02-28 VITALS
SYSTOLIC BLOOD PRESSURE: 123 MMHG | HEART RATE: 81 BPM | BODY MASS INDEX: 35.78 KG/M2 | DIASTOLIC BLOOD PRESSURE: 86 MMHG | HEIGHT: 73 IN | WEIGHT: 270 LBS

## 2022-02-28 DIAGNOSIS — I71.40 ABDOMINAL AORTIC ANEURYSM (AAA) GREATER THAN 5.5 CM IN DIAMETER IN MALE: ICD-10-CM

## 2022-02-28 DIAGNOSIS — Z87.891 FORMER SMOKER: ICD-10-CM

## 2022-02-28 DIAGNOSIS — I71.40 ABDOMINAL AORTIC ANEURYSM (AAA) GREATER THAN 5.5 CM IN DIAMETER IN MALE: Primary | ICD-10-CM

## 2022-02-28 DIAGNOSIS — I71.43 ANEURYSM OF INFRARENAL ABDOMINAL AORTA: Primary | ICD-10-CM

## 2022-02-28 DIAGNOSIS — G47.30 SLEEP APNEA, UNSPECIFIED TYPE: ICD-10-CM

## 2022-02-28 DIAGNOSIS — E78.2 MIXED HYPERLIPIDEMIA: ICD-10-CM

## 2022-02-28 DIAGNOSIS — I10 ESSENTIAL HYPERTENSION: ICD-10-CM

## 2022-02-28 DIAGNOSIS — I72.4 POPLITEAL ARTERY ANEURYSM (HCC): ICD-10-CM

## 2022-02-28 DIAGNOSIS — I25.10 CORONARY ARTERY DISEASE INVOLVING NATIVE CORONARY ARTERY OF NATIVE HEART WITHOUT ANGINA PECTORIS: ICD-10-CM

## 2022-02-28 PROCEDURE — 75635 CT ANGIO ABDOMINAL ARTERIES: CPT

## 2022-02-28 PROCEDURE — 99204 OFFICE O/P NEW MOD 45 MIN: CPT | Performed by: STUDENT IN AN ORGANIZED HEALTH CARE EDUCATION/TRAINING PROGRAM

## 2022-02-28 PROCEDURE — 6360000004 HC RX CONTRAST MEDICATION: Performed by: STUDENT IN AN ORGANIZED HEALTH CARE EDUCATION/TRAINING PROGRAM

## 2022-02-28 PROCEDURE — 99204 OFFICE O/P NEW MOD 45 MIN: CPT | Performed by: INTERNAL MEDICINE

## 2022-02-28 RX ADMIN — IOPAMIDOL 75 ML: 755 INJECTION, SOLUTION INTRAVENOUS at 08:42

## 2022-02-28 ASSESSMENT — ENCOUNTER SYMPTOMS
ABDOMINAL PAIN: 0
SHORTNESS OF BREATH: 1
BACK PAIN: 0

## 2022-02-28 NOTE — PROGRESS NOTES
Ilsa Shukla (:  1955) is a 73 y.o. male,New patient, here for evaluation of the following chief complaint(s):  Consultation         ASSESSMENT/PLAN:  1. Abdominal aortic aneurysm (AAA) greater than 5.5 cm in diameter in male [facility])       This is a 69-year-old male patient presents the office to discuss a large abdominal aortic aneurysm. He has a complicated and distorted recent history of course. He has developed a new axillary mass that is concern for accelerated growth of a squamous cell carcinoma. There is some concern about its ability to metastasize and get worse. Therefore he is undergone a myriad of tests and scans in the interim. This is revealed that he has had enlargement of his abdominal aortic aneurysm. It is now close to 7 cm. This puts him in a rupture category of around 10% per jojo. Therefore he was referred for the treatment of this. 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 Denies any symptoms associated with either. As long as it is okay I think it is a good enough time to proceed with EVAR to treat the abdominal aortic aneurysm. I am also concerned about the left popliteal aneurysm repair. It is confluent and encompassing the adductor space therefore stenting is not ideal.  The vessel of course is also large and require least an 8 or 9 mm stent just to treat it. Once the AAA is treated with EVAR this precludes any up and over treatment options. Therefore antegrade with likely a cutdown would have to be done to treat this with a stent. If that is to be indicated then the better option would be to go ahead and proceed with bypass and exclusion of the aneurysm ideally. However of course the issue with that is if he is ever currently on chemotherapy here in the near future then wound healing will be slightly complicated.   However that being said the most important issue is the abdominal aortic aneurysm this is the most and significantly life-threatening issue. The left popliteal aneurysm can become a limb threatening issue at any given time as well. He has no signs of thromboembolic phenomenon. Once again he does not endorse any lower extremity symptoms. We will discuss with his cardiologist about the plans going forward. Once we get clearance we will proceed with EVAR. After that I presume that they will proceed to treat his cancer with I would presume surgery and chemo-rads. From there we can decide when the timing is to fix his popliteal artery aneurysm repair. Once again he can have a stent placed however it would have to be done likely not percutaneously but with an open femoral artery exposure just because the sheer size of sheath as it be difficult to get closure with antegrade puncture of a large 9 maybe 10 [place] sheath. All the risks benefits and alternatives were clearly reviewed for treating his abdominal aortic aneurysm. We will attempt to do this percutaneously. Once clearance is obtained and a time is obtained we will do this hopefully within the next 1 to 2 weeks. All questions answered. Subjective   SUBJECTIVE/OBJECTIVE:  This is a 59-year-old male patient presents the office today to discuss abdominal aortic aneurysm repair. He has a history that recently began in the last couple of months with discovery of a left axillary mass. The mass appears to be adjacent to lymph nodes. He has undergone biopsies which have demonstrated squamous cell carcinoma. The primary is yet to be elucidated. His team is certain that it something else unrelated. However in the conduct of conducting a PET scan may discover that he has an abdominal cardiac aneurysm that is grown significantly in size. He is to be around 3 cm around 5 years ago it is now closer to 7 cm. Patient denies abdominal or back pain. He denies any chest pain or shortness of breath.   He does have a history of coronary disease and is undergone arteriograms which have demonstrated what appears to be arteriomegaly with mild stenosis of his coronaries. He is supposed be taking warfarin and Plavix. He is taking neither at this time. He was taking Plavix but stopped that for what appears to be series of procedures planned in the near future. He had no idea that he had a popliteal aneurysm on the left side. He denies any ambulatory claudication symptoms. He does get short of breath with walking related to certain medication that he takes. He is planned to undergo bladder biopsy with possible evaluation for possible source here in the near future. Review of Systems   Constitutional: Negative for chills and fever. Respiratory: Positive for shortness of breath. Cardiovascular: Negative for chest pain and leg swelling. Gastrointestinal: Negative for abdominal pain. Musculoskeletal: Negative for back pain and myalgias. Skin: Negative for wound. Neurological: Negative for weakness. Hematological: Does not bruise/bleed easily. Psychiatric/Behavioral: Negative for agitation. Objective   Physical Exam  Cardiovascular:      Rate and Rhythm: Normal rate and regular rhythm. Pulses: Normal pulses. Pulmonary:      Effort: Pulmonary effort is normal. No respiratory distress. Abdominal:      Palpations: Abdomen is soft. Tenderness: There is no abdominal tenderness. Musculoskeletal:         General: Normal range of motion. Skin:     General: Skin is warm and dry. Capillary Refill: Capillary refill takes less than 2 seconds. Comments: Unable to palpate popliteal pulse behind knee   Neurological:      General: No focal deficit present. Mental Status: He is alert. Psychiatric:         Mood and Affect: Mood normal.         Behavior: Behavior normal.         Thought Content:  Thought content normal.         Judgment: Judgment normal.            On this date 2/28/2022 I have spent 50 minutes reviewing previous notes, test results and face to face with the patient discussing the diagnosis and importance of compliance with the treatment plan as well as documenting on the day of the visit. Qamar Ward DO, [address] [facility]    This document was dictated using voice recognition software.

## 2022-02-28 NOTE — TELEPHONE ENCOUNTER
Patient advised to begin ASA 81 mg per Dr Nikko Zapata and Dr Keller Denver. Pt verbalized understanding.

## 2022-03-01 ENCOUNTER — TELEPHONE (OUTPATIENT)
Dept: CARDIOLOGY CLINIC | Age: 67
End: 2022-03-01

## 2022-03-01 ENCOUNTER — TELEPHONE (OUTPATIENT)
Dept: VASCULAR SURGERY | Age: 67
End: 2022-03-01

## 2022-03-01 DIAGNOSIS — I71.40 ABDOMINAL AORTIC ANEURYSM (AAA) WITHOUT RUPTURE: Primary | ICD-10-CM

## 2022-03-01 RX ORDER — ASPIRIN 81 MG/1
81 TABLET ORAL DAILY
COMMUNITY

## 2022-03-01 NOTE — TELEPHONE ENCOUNTER
Dr. Wade Fairchild    Patient was seen in the office yesterday regarding HTN and AAA. He was then seen in the office with Dr. Deniz Grigsby who now plans to proceed with AAA repair on 3/4/22. Please advise regarding clearance.

## 2022-03-01 NOTE — PROGRESS NOTES
Covid testing to be done @ pt will go to [facility] on 3/2/22  If positive---Pt instructed to notify MD ASAP   If negative--pt was instructed to bring results DOP/DOS      Preoperative Screening for Elective Surgery/Invasive Procedures While COVID-19 present in the community     1. Have you tested positive or have been told to self-isolate for COVID-19 like symptoms within the past 28 days?no  2. Do you currently have any of the following symptoms?no  ? Fever >100.0 F or 99.9 F in immunocompromised patients?n  ? New onset cough, shortness of breath or difficulty breathing?n  ? New onset sore throat, myalgia (muscle aches and pains), headache, loss of taste/smell or diarrhea?n  3. Have you had a potential exposure to COVID-19 within the past 14 days by:no  ? Close contact with a confirmed case?n  ? Close contact with a healthcare worker,  or essential infrastructure worker (grocery store, [employer], gas station, public utilities or transportation)?no  ? Do you reside in a congregate setting such as; skilled nursing facility, adult home, correctional facility, homeless shelter or other institutional setting?n  ? Have you had recent travel to a known COVID-19 hotspot?n     Indicate if the patient has a positive screen by answering yes to one or more of the above questions. If patient is unable to obtain a COVID test prior to DOS/DOP will need RAPID DOS. C-Difficile admission screening and protocol:     * Admitted with diarrhea?n     *Prior history of C-Diff. In last 3 months?n     *Antibiotic use in the past 6-8 weeks?n     *Prior hospitalization or nursing home in the last month?n      SAFETY FIRST. .call before you fall  [address] [facility] time_______6        Surgery time________730    Take the following medications with a sip of water:    Do not eat or drink anything after 12:00 midnight prior to your surgery. This includes water chewing gum, mints and ice chips. You may brush your teeth and gargle the morning of your surgery, but do not swallow the water     Please see your family doctor/pediatrician for a history and physical and/or concerning medications. Bring any test results/reports from your physicians office. If you are under the care of a heart doctor or specialist doctor, please be aware that you may be asked to them for clearance    You may be asked to stop blood thinners such as Coumadin, Plavix, Fragmin, Lovenox, etc., or any anti-inflammatories such as:  Aspirin, Ibuprofen, Advil, Naproxen prior to your surgery. We also ask that you stop any OTC medications such as fish oil, vitamin E, glucosamine, garlic, Multivitamins, COQ 10, etc.    We ask that you do not smoke 24 hours prior to surgery  We ask that you do not  drink any alcoholic beverages 24 hours prior to surgery     You must make arrangements for a responsible adult to take you home after your surgery. For your safety you will not be allowed to leave alone or drive yourself home. Your surgery will be cancelled if you do not have a ride home. Also for your safety, it is strongly suggested that someone stay with you the first 24 hours after your surgery. A parent or legal guardian must accompany a child scheduled for surgery and plan to stay at the hospital until the child is discharged. Please do not bring other children with you. For your comfort, please wear simple loose fitting clothing to the hospital.  Please do not bring valuables. Do not wear any make-up or nail polish on your fingers or toes      For your safety, please do not wear any jewelry or body piercing's on the day of surgery. All jewelry must be removed. If you have dentures, they will be removed before going to operating room. For your convenience, we will provide you with a container. If you wear contact lenses or glasses, they will be removed, please bring a case for them.      If you have a living will and a durable power of  for healthcare, please bring in a copy. As part of our patient safety program to minimize surgical site infections, we ask you to do the following:    · Please notify your surgeon if you develop any illness between         now and the  day of your surgery. · This includes a cough, cold, fever, sore throat, nausea,         or vomiting, and diarrhea, etc.  ·  Please notify your surgeon if you experience dizziness, shortness         of breath or blurred vision between now and the time of your surgery. Do not shave your operative site 96 hours prior to surgery. For face and neck surgery, men may use an electric razor 48 hours   prior to surgery. You may shower the night before surgery or the morning of   your surgery with an antibacterial soap. You will need to bring a photo ID and insurance card    [facility] has an onsite pharmacy, would you like to utilize our pharmacy     If you will be staying overnight and use a C-pap machine, please bring   your C-pap to hospital     Our goal is to provide you with excellent care, therefore, visitors will be limited to two(2) in the room at a time so that we may focus on providing this care for you. Please contact pre-admission testing if you have any further questions. [facility] phone number:  [phone] PAT fax number:  [phone]  Please note these are generalized instructions for all surgical cases, you may be provided with more specific instructions according to your surgery.

## 2022-03-01 NOTE — TELEPHONE ENCOUNTER
Cardiac Risk Assessment message information:     What type of procedure are you having:   Triple AAA    When is your procedure scheduled for:   3/4/22    Who is the physician performing your procedure:  Dr. Niyah Valdez    Which medications need to be held for your procedure and for how long:   Pt has not been taking plavix for 2 weeks and Dr. Deniz Grigsby is okay with him continuing low dose aspirin.        Phone Number:  [phone]     Fax number to send the letter:  [phone]    Clinical Staff use:     Cardiologist:  Last Appointment:  Next Appointment:  Are you on any blood thinners:  History of Coronary Artery Disease:  Last stress test:  Last echo:  Device Type and :

## 2022-03-01 NOTE — TELEPHONE ENCOUNTER
Spoke with patient regarding scheduled surgery on 03- with Dr. Jeff Cobian. Patient is asked to arrive by 6:00 AM @ [facility] after midnight. May take any Heart or Blood Pressure medication with a small sip of water to wash them down. Continue taking your 81mg Aspirin. Continue to hold your Plavix until after surgery. Please bring a Photo ID & Insurance card with you, and check-in at the Surgery Desk down the right-hand hallway on the first floor. Patient has been advised he will need to obtain a Covid Test today or tomorrow at the latest.  Home test is not admisable for surgery. Surgery is scheduled to start at approx. 7:30 AM and should take approx. 120 minutes. If the hospital needs any further information, someone will give you a call. Patient expressed a verbal understanding of these instructions and had no further questions at this time. Call ended.

## 2022-03-02 DIAGNOSIS — Z41.9 SURGERY, ELECTIVE: Primary | ICD-10-CM

## 2022-03-02 DIAGNOSIS — Z41.9 SURGERY, ELECTIVE: ICD-10-CM

## 2022-03-03 ENCOUNTER — ANESTHESIA EVENT (OUTPATIENT)
Dept: OPERATING ROOM | Age: 67
DRG: 269 | End: 2022-03-03
Payer: COMMERCIAL

## 2022-03-03 LAB — SARS-COV-2: NOT DETECTED

## 2022-03-04 ENCOUNTER — ANESTHESIA (OUTPATIENT)
Dept: OPERATING ROOM | Age: 67
DRG: 269 | End: 2022-03-04
Payer: COMMERCIAL

## 2022-03-04 ENCOUNTER — HOSPITAL ENCOUNTER (INPATIENT)
Age: 67
LOS: 1 days | Discharge: HOME | DRG: 269 | End: 2022-03-05
Attending: STUDENT IN AN ORGANIZED HEALTH CARE EDUCATION/TRAINING PROGRAM | Admitting: STUDENT IN AN ORGANIZED HEALTH CARE EDUCATION/TRAINING PROGRAM
Payer: COMMERCIAL

## 2022-03-04 VITALS
SYSTOLIC BLOOD PRESSURE: 127 MMHG | OXYGEN SATURATION: 95 % | TEMPERATURE: 97.2 F | RESPIRATION RATE: 15 BRPM | DIASTOLIC BLOOD PRESSURE: 84 MMHG

## 2022-03-04 DIAGNOSIS — I71.40 ABDOMINAL AORTIC ANEURYSM (AAA) GREATER THAN 5.5 CM IN DIAMETER IN MALE: Primary | ICD-10-CM

## 2022-03-04 LAB
ABO/RH: NORMAL
ANION GAP SERPL CALCULATED.3IONS-SCNC: 12 MMOL/L (ref 3–16)
ANTIBODY SCREEN: NORMAL
BUN BLDV-MCNC: 16 MG/DL (ref 7–20)
CALCIUM SERPL-MCNC: 9.4 MG/DL (ref 8.3–10.6)
CHLORIDE BLD-SCNC: 100 MMOL/L (ref 99–110)
CO2: 26 MMOL/L (ref 21–32)
CREAT SERPL-MCNC: 1 MG/DL (ref 0.8–1.3)
EKG ATRIAL RATE: 65 BPM
EKG DIAGNOSIS: NORMAL
EKG P AXIS: 19 DEGREES
EKG P-R INTERVAL: 224 MS
EKG Q-T INTERVAL: 394 MS
EKG QRS DURATION: 92 MS
EKG QTC CALCULATION (BAZETT): 409 MS
EKG R AXIS: -17 DEGREES
EKG T AXIS: 4 DEGREES
EKG VENTRICULAR RATE: 65 BPM
GFR AFRICAN AMERICAN: >60
GFR NON-AFRICAN AMERICAN: >60
GLUCOSE BLD-MCNC: 114 MG/DL (ref 70–99)
HCT VFR BLD CALC: 42.1 % (ref 40.5–52.5)
HCT VFR BLD CALC: 45.7 % (ref 40.5–52.5)
HEMOGLOBIN: 13.6 G/DL (ref 13.5–17.5)
HEMOGLOBIN: 15.1 G/DL (ref 13.5–17.5)
MCH RBC QN AUTO: 31.4 PG (ref 26–34)
MCH RBC QN AUTO: 31.9 PG (ref 26–34)
MCHC RBC AUTO-ENTMCNC: 32.4 G/DL (ref 31–36)
MCHC RBC AUTO-ENTMCNC: 33 G/DL (ref 31–36)
MCV RBC AUTO: 96.6 FL (ref 80–100)
MCV RBC AUTO: 96.9 FL (ref 80–100)
PDW BLD-RTO: 13.1 % (ref 12.4–15.4)
PDW BLD-RTO: 13.3 % (ref 12.4–15.4)
PLATELET # BLD: 221 K/UL (ref 135–450)
PLATELET # BLD: 253 K/UL (ref 135–450)
PMV BLD AUTO: 7.2 FL (ref 5–10.5)
PMV BLD AUTO: 7.2 FL (ref 5–10.5)
POTASSIUM REFLEX MAGNESIUM: 4.2 MMOL/L (ref 3.5–5.1)
RBC # BLD: 4.34 M/UL (ref 4.2–5.9)
RBC # BLD: 4.73 M/UL (ref 4.2–5.9)
SODIUM BLD-SCNC: 138 MMOL/L (ref 136–145)
WBC # BLD: 6 K/UL (ref 4–11)
WBC # BLD: 6.7 K/UL (ref 4–11)

## 2022-03-04 PROCEDURE — 6360000002 HC RX W HCPCS

## 2022-03-04 PROCEDURE — A4217 STERILE WATER/SALINE, 500 ML: HCPCS | Performed by: STUDENT IN AN ORGANIZED HEALTH CARE EDUCATION/TRAINING PROGRAM

## 2022-03-04 PROCEDURE — 80048 BASIC METABOLIC PNL TOTAL CA: CPT

## 2022-03-04 PROCEDURE — C1768 GRAFT, VASCULAR: HCPCS | Performed by: STUDENT IN AN ORGANIZED HEALTH CARE EDUCATION/TRAINING PROGRAM

## 2022-03-04 PROCEDURE — 93010 ELECTROCARDIOGRAM REPORT: CPT | Performed by: INTERNAL MEDICINE

## 2022-03-04 PROCEDURE — 6370000000 HC RX 637 (ALT 250 FOR IP): Performed by: STUDENT IN AN ORGANIZED HEALTH CARE EDUCATION/TRAINING PROGRAM

## 2022-03-04 PROCEDURE — 6360000002 HC RX W HCPCS: Performed by: STUDENT IN AN ORGANIZED HEALTH CARE EDUCATION/TRAINING PROGRAM

## 2022-03-04 PROCEDURE — 93005 ELECTROCARDIOGRAM TRACING: CPT | Performed by: ANESTHESIOLOGY

## 2022-03-04 PROCEDURE — C2628 CATHETER, OCCLUSION: HCPCS | Performed by: STUDENT IN AN ORGANIZED HEALTH CARE EDUCATION/TRAINING PROGRAM

## 2022-03-04 PROCEDURE — 2500000003 HC RX 250 WO HCPCS

## 2022-03-04 PROCEDURE — 3700000000 HC ANESTHESIA ATTENDED CARE: Performed by: STUDENT IN AN ORGANIZED HEALTH CARE EDUCATION/TRAINING PROGRAM

## 2022-03-04 PROCEDURE — 3600000007 HC SURGERY HYBRID BASE: Performed by: STUDENT IN AN ORGANIZED HEALTH CARE EDUCATION/TRAINING PROGRAM

## 2022-03-04 PROCEDURE — A4216 STERILE WATER/SALINE, 10 ML: HCPCS

## 2022-03-04 PROCEDURE — 2709999900 HC NON-CHARGEABLE SUPPLY: Performed by: STUDENT IN AN ORGANIZED HEALTH CARE EDUCATION/TRAINING PROGRAM

## 2022-03-04 PROCEDURE — 6360000004 HC RX CONTRAST MEDICATION: Performed by: STUDENT IN AN ORGANIZED HEALTH CARE EDUCATION/TRAINING PROGRAM

## 2022-03-04 PROCEDURE — 2580000003 HC RX 258

## 2022-03-04 PROCEDURE — 2700000000 HC OXYGEN THERAPY PER DAY

## 2022-03-04 PROCEDURE — 2580000003 HC RX 258: Performed by: STUDENT IN AN ORGANIZED HEALTH CARE EDUCATION/TRAINING PROGRAM

## 2022-03-04 PROCEDURE — 2100000000 HC CCU R&B

## 2022-03-04 PROCEDURE — 7100000000 HC PACU RECOVERY - FIRST 15 MIN

## 2022-03-04 PROCEDURE — 85027 COMPLETE CBC AUTOMATED: CPT

## 2022-03-04 PROCEDURE — 34705 EVAC RPR A-BIILIAC NDGFT: CPT | Performed by: STUDENT IN AN ORGANIZED HEALTH CARE EDUCATION/TRAINING PROGRAM

## 2022-03-04 PROCEDURE — 7100000001 HC PACU RECOVERY - ADDTL 15 MIN

## 2022-03-04 PROCEDURE — 94761 N-INVAS EAR/PLS OXIMETRY MLT: CPT

## 2022-03-04 PROCEDURE — 86900 BLOOD TYPING SEROLOGIC ABO: CPT

## 2022-03-04 PROCEDURE — 3600000017 HC SURGERY HYBRID ADDL 15MIN: Performed by: STUDENT IN AN ORGANIZED HEALTH CARE EDUCATION/TRAINING PROGRAM

## 2022-03-04 PROCEDURE — 34713 PERQ ACCESS & CLSR FEM ART: CPT | Performed by: STUDENT IN AN ORGANIZED HEALTH CARE EDUCATION/TRAINING PROGRAM

## 2022-03-04 PROCEDURE — 2780000010 HC IMPLANT OTHER: Performed by: STUDENT IN AN ORGANIZED HEALTH CARE EDUCATION/TRAINING PROGRAM

## 2022-03-04 PROCEDURE — 6360000002 HC RX W HCPCS: Performed by: ANESTHESIOLOGY

## 2022-03-04 PROCEDURE — 2580000003 HC RX 258: Performed by: ANESTHESIOLOGY

## 2022-03-04 PROCEDURE — 04R04JZ REPLACEMENT OF ABDOMINAL AORTA WITH SYNTHETIC SUBSTITUTE, PERCUTANEOUS ENDOSCOPIC APPROACH: ICD-10-PCS | Performed by: STUDENT IN AN ORGANIZED HEALTH CARE EDUCATION/TRAINING PROGRAM

## 2022-03-04 PROCEDURE — 86901 BLOOD TYPING SEROLOGIC RH(D): CPT

## 2022-03-04 PROCEDURE — 3700000001 HC ADD 15 MINUTES (ANESTHESIA): Performed by: STUDENT IN AN ORGANIZED HEALTH CARE EDUCATION/TRAINING PROGRAM

## 2022-03-04 PROCEDURE — 86850 RBC ANTIBODY SCREEN: CPT

## 2022-03-04 PROCEDURE — C1894 INTRO/SHEATH, NON-LASER: HCPCS | Performed by: STUDENT IN AN ORGANIZED HEALTH CARE EDUCATION/TRAINING PROGRAM

## 2022-03-04 DEVICE — GRAFT AAA ENDOPROSTHESIS MAIN BODY: Type: IMPLANTABLE DEVICE | Site: AORTA | Status: FUNCTIONAL

## 2022-03-04 DEVICE — GRAFT EVAR L11.5CM DIA20MM CONTRALATERAL LEG FOR AAA: Type: IMPLANTABLE DEVICE | Site: AORTA | Status: FUNCTIONAL

## 2022-03-04 DEVICE — GRAFT EVAR L14CM DIA23MM CONTRALATERAL LEG FOR AAA EXCLUDER: Type: IMPLANTABLE DEVICE | Site: AORTA | Status: FUNCTIONAL

## 2022-03-04 RX ORDER — SODIUM CHLORIDE 0.9 % (FLUSH) 0.9 %
5-40 SYRINGE (ML) INJECTION PRN
Status: DISCONTINUED | OUTPATIENT
Start: 2022-03-04 | End: 2022-03-04 | Stop reason: SDUPTHER

## 2022-03-04 RX ORDER — ASPIRIN 81 MG/1
81 TABLET ORAL DAILY
Status: DISCONTINUED | OUTPATIENT
Start: 2022-03-05 | End: 2022-03-05 | Stop reason: HOSPADM

## 2022-03-04 RX ORDER — SODIUM CHLORIDE 9 MG/ML
25 INJECTION, SOLUTION INTRAVENOUS PRN
Status: DISCONTINUED | OUTPATIENT
Start: 2022-03-04 | End: 2022-03-05 | Stop reason: HOSPADM

## 2022-03-04 RX ORDER — SODIUM CHLORIDE 9 MG/ML
INJECTION, SOLUTION INTRAVENOUS CONTINUOUS
Status: DISCONTINUED | OUTPATIENT
Start: 2022-03-04 | End: 2022-03-04

## 2022-03-04 RX ORDER — FENTANYL CITRATE 50 UG/ML
50 INJECTION, SOLUTION INTRAMUSCULAR; INTRAVENOUS EVERY 5 MIN PRN
Status: COMPLETED | OUTPATIENT
Start: 2022-03-04 | End: 2022-03-04

## 2022-03-04 RX ORDER — SODIUM CHLORIDE 9 MG/ML
25 INJECTION, SOLUTION INTRAVENOUS PRN
Status: DISCONTINUED | OUTPATIENT
Start: 2022-03-04 | End: 2022-03-04 | Stop reason: HOSPADM

## 2022-03-04 RX ORDER — PROTAMINE SULFATE 10 MG/ML
INJECTION, SOLUTION INTRAVENOUS PRN
Status: DISCONTINUED | OUTPATIENT
Start: 2022-03-04 | End: 2022-03-04 | Stop reason: SDUPTHER

## 2022-03-04 RX ORDER — ONDANSETRON 2 MG/ML
4 INJECTION INTRAMUSCULAR; INTRAVENOUS EVERY 6 HOURS PRN
Status: DISCONTINUED | OUTPATIENT
Start: 2022-03-04 | End: 2022-03-05 | Stop reason: HOSPADM

## 2022-03-04 RX ORDER — MAGNESIUM HYDROXIDE 1200 MG/15ML
LIQUID ORAL CONTINUOUS PRN
Status: COMPLETED | OUTPATIENT
Start: 2022-03-04 | End: 2022-03-04

## 2022-03-04 RX ORDER — METOPROLOL SUCCINATE 50 MG/1
50 TABLET, EXTENDED RELEASE ORAL 2 TIMES DAILY
Status: DISCONTINUED | OUTPATIENT
Start: 2022-03-04 | End: 2022-03-05 | Stop reason: HOSPADM

## 2022-03-04 RX ORDER — SODIUM CHLORIDE 9 MG/ML
25 INJECTION, SOLUTION INTRAVENOUS PRN
Status: DISCONTINUED | OUTPATIENT
Start: 2022-03-04 | End: 2022-03-04 | Stop reason: SDUPTHER

## 2022-03-04 RX ORDER — SODIUM CHLORIDE 0.9 % (FLUSH) 0.9 %
5-40 SYRINGE (ML) INJECTION PRN
Status: DISCONTINUED | OUTPATIENT
Start: 2022-03-04 | End: 2022-03-04 | Stop reason: HOSPADM

## 2022-03-04 RX ORDER — OXYCODONE HYDROCHLORIDE 5 MG/1
5 TABLET ORAL PRN
Status: DISCONTINUED | OUTPATIENT
Start: 2022-03-04 | End: 2022-03-04 | Stop reason: HOSPADM

## 2022-03-04 RX ORDER — SODIUM CHLORIDE 0.9 % (FLUSH) 0.9 %
5-40 SYRINGE (ML) INJECTION EVERY 12 HOURS SCHEDULED
Status: DISCONTINUED | OUTPATIENT
Start: 2022-03-04 | End: 2022-03-04 | Stop reason: HOSPADM

## 2022-03-04 RX ORDER — FENTANYL CITRATE 50 UG/ML
INJECTION, SOLUTION INTRAMUSCULAR; INTRAVENOUS PRN
Status: DISCONTINUED | OUTPATIENT
Start: 2022-03-04 | End: 2022-03-04 | Stop reason: SDUPTHER

## 2022-03-04 RX ORDER — SODIUM CHLORIDE 0.9 % (FLUSH) 0.9 %
5-40 SYRINGE (ML) INJECTION EVERY 12 HOURS SCHEDULED
Status: DISCONTINUED | OUTPATIENT
Start: 2022-03-04 | End: 2022-03-04 | Stop reason: SDUPTHER

## 2022-03-04 RX ORDER — LIDOCAINE HYDROCHLORIDE 20 MG/ML
INJECTION, SOLUTION EPIDURAL; INFILTRATION; INTRACAUDAL; PERINEURAL PRN
Status: DISCONTINUED | OUTPATIENT
Start: 2022-03-04 | End: 2022-03-04 | Stop reason: SDUPTHER

## 2022-03-04 RX ORDER — MEXILETINE HYDROCHLORIDE 150 MG/1
150 CAPSULE ORAL 2 TIMES DAILY
Status: DISCONTINUED | OUTPATIENT
Start: 2022-03-04 | End: 2022-03-05 | Stop reason: HOSPADM

## 2022-03-04 RX ORDER — LISINOPRIL AND HYDROCHLOROTHIAZIDE 12.5; 1 MG/1; MG/1
1 TABLET ORAL 2 TIMES DAILY
Status: DISCONTINUED | OUTPATIENT
Start: 2022-03-04 | End: 2022-03-05 | Stop reason: HOSPADM

## 2022-03-04 RX ORDER — MEPERIDINE HYDROCHLORIDE 25 MG/ML
12.5 INJECTION INTRAMUSCULAR; INTRAVENOUS; SUBCUTANEOUS EVERY 5 MIN PRN
Status: DISCONTINUED | OUTPATIENT
Start: 2022-03-04 | End: 2022-03-04 | Stop reason: HOSPADM

## 2022-03-04 RX ORDER — ONDANSETRON 4 MG/1
4 TABLET, ORALLY DISINTEGRATING ORAL EVERY 8 HOURS PRN
Status: DISCONTINUED | OUTPATIENT
Start: 2022-03-04 | End: 2022-03-05 | Stop reason: HOSPADM

## 2022-03-04 RX ORDER — GLYCOPYRROLATE 0.2 MG/ML
INJECTION INTRAMUSCULAR; INTRAVENOUS PRN
Status: DISCONTINUED | OUTPATIENT
Start: 2022-03-04 | End: 2022-03-04 | Stop reason: SDUPTHER

## 2022-03-04 RX ORDER — PROPOFOL 10 MG/ML
INJECTION, EMULSION INTRAVENOUS PRN
Status: DISCONTINUED | OUTPATIENT
Start: 2022-03-04 | End: 2022-03-04 | Stop reason: SDUPTHER

## 2022-03-04 RX ORDER — FENTANYL CITRATE 50 UG/ML
50 INJECTION, SOLUTION INTRAMUSCULAR; INTRAVENOUS EVERY 5 MIN PRN
Status: DISCONTINUED | OUTPATIENT
Start: 2022-03-04 | End: 2022-03-04 | Stop reason: HOSPADM

## 2022-03-04 RX ORDER — SODIUM CHLORIDE 0.9 % (FLUSH) 0.9 %
5-40 SYRINGE (ML) INJECTION EVERY 12 HOURS SCHEDULED
Status: DISCONTINUED | OUTPATIENT
Start: 2022-03-04 | End: 2022-03-05 | Stop reason: HOSPADM

## 2022-03-04 RX ORDER — HEPARIN SODIUM 1000 [USP'U]/ML
INJECTION, SOLUTION INTRAVENOUS; SUBCUTANEOUS PRN
Status: DISCONTINUED | OUTPATIENT
Start: 2022-03-04 | End: 2022-03-04 | Stop reason: SDUPTHER

## 2022-03-04 RX ORDER — OXYCODONE HYDROCHLORIDE 10 MG/1
10 TABLET ORAL EVERY 4 HOURS PRN
Status: DISCONTINUED | OUTPATIENT
Start: 2022-03-04 | End: 2022-03-05 | Stop reason: HOSPADM

## 2022-03-04 RX ORDER — MIDAZOLAM HYDROCHLORIDE 1 MG/ML
INJECTION INTRAMUSCULAR; INTRAVENOUS PRN
Status: DISCONTINUED | OUTPATIENT
Start: 2022-03-04 | End: 2022-03-04 | Stop reason: SDUPTHER

## 2022-03-04 RX ORDER — OXYCODONE HYDROCHLORIDE 5 MG/1
5 TABLET ORAL EVERY 4 HOURS PRN
Status: DISCONTINUED | OUTPATIENT
Start: 2022-03-04 | End: 2022-03-05 | Stop reason: HOSPADM

## 2022-03-04 RX ORDER — SODIUM CHLORIDE 9 MG/ML
INJECTION, SOLUTION INTRAVENOUS CONTINUOUS
Status: DISCONTINUED | OUTPATIENT
Start: 2022-03-04 | End: 2022-03-05 | Stop reason: HOSPADM

## 2022-03-04 RX ORDER — SODIUM CHLORIDE 0.9 % (FLUSH) 0.9 %
5-40 SYRINGE (ML) INJECTION PRN
Status: DISCONTINUED | OUTPATIENT
Start: 2022-03-04 | End: 2022-03-05 | Stop reason: HOSPADM

## 2022-03-04 RX ORDER — DEXAMETHASONE SODIUM PHOSPHATE 4 MG/ML
INJECTION, SOLUTION INTRA-ARTICULAR; INTRALESIONAL; INTRAMUSCULAR; INTRAVENOUS; SOFT TISSUE PRN
Status: DISCONTINUED | OUTPATIENT
Start: 2022-03-04 | End: 2022-03-04 | Stop reason: SDUPTHER

## 2022-03-04 RX ORDER — OXYCODONE HYDROCHLORIDE 10 MG/1
10 TABLET ORAL PRN
Status: DISCONTINUED | OUTPATIENT
Start: 2022-03-04 | End: 2022-03-04 | Stop reason: HOSPADM

## 2022-03-04 RX ORDER — FENTANYL CITRATE 50 UG/ML
25 INJECTION, SOLUTION INTRAMUSCULAR; INTRAVENOUS EVERY 5 MIN PRN
Status: DISCONTINUED | OUTPATIENT
Start: 2022-03-04 | End: 2022-03-04 | Stop reason: HOSPADM

## 2022-03-04 RX ORDER — ONDANSETRON 2 MG/ML
INJECTION INTRAMUSCULAR; INTRAVENOUS PRN
Status: DISCONTINUED | OUTPATIENT
Start: 2022-03-04 | End: 2022-03-04 | Stop reason: SDUPTHER

## 2022-03-04 RX ORDER — ONDANSETRON 2 MG/ML
4 INJECTION INTRAMUSCULAR; INTRAVENOUS
Status: DISCONTINUED | OUTPATIENT
Start: 2022-03-04 | End: 2022-03-04 | Stop reason: HOSPADM

## 2022-03-04 RX ORDER — FENTANYL CITRATE 50 UG/ML
25 INJECTION, SOLUTION INTRAMUSCULAR; INTRAVENOUS EVERY 5 MIN PRN
Status: DISCONTINUED | OUTPATIENT
Start: 2022-03-04 | End: 2022-03-04

## 2022-03-04 RX ADMIN — MIDAZOLAM 2 MG: 1 INJECTION INTRAMUSCULAR; INTRAVENOUS at 07:20

## 2022-03-04 RX ADMIN — DEXAMETHASONE SODIUM PHOSPHATE 10 MG: 4 INJECTION, SOLUTION INTRAMUSCULAR; INTRAVENOUS at 07:45

## 2022-03-04 RX ADMIN — HEPARIN SODIUM 10000 UNITS: 1000 INJECTION INTRAVENOUS; SUBCUTANEOUS at 08:08

## 2022-03-04 RX ADMIN — OXYCODONE HYDROCHLORIDE 10 MG: 10 TABLET ORAL at 17:37

## 2022-03-04 RX ADMIN — PHENYLEPHRINE HYDROCHLORIDE 25 MCG/MIN: 10 INJECTION INTRAVENOUS at 07:44

## 2022-03-04 RX ADMIN — Medication 10 ML: at 20:24

## 2022-03-04 RX ADMIN — FENTANYL CITRATE 100 MCG: 50 INJECTION INTRAMUSCULAR; INTRAVENOUS at 07:20

## 2022-03-04 RX ADMIN — FENTANYL CITRATE 25 MCG: 50 INJECTION INTRAMUSCULAR; INTRAVENOUS at 07:59

## 2022-03-04 RX ADMIN — BENZOCAINE AND MENTHOL 1 LOZENGE: 15; 3.6 LOZENGE ORAL at 20:27

## 2022-03-04 RX ADMIN — FENTANYL CITRATE 75 MCG: 50 INJECTION INTRAMUSCULAR; INTRAVENOUS at 07:34

## 2022-03-04 RX ADMIN — OXYCODONE HYDROCHLORIDE 10 MG: 10 TABLET ORAL at 21:40

## 2022-03-04 RX ADMIN — LISINOPRIL AND HYDROCHLOROTHIAZIDE 1 TABLET: 12.5; 1 TABLET ORAL at 21:53

## 2022-03-04 RX ADMIN — PROPOFOL 150 MG: 10 INJECTION, EMULSION INTRAVENOUS at 07:34

## 2022-03-04 RX ADMIN — BENZOCAINE AND MENTHOL 1 LOZENGE: 15; 3.6 LOZENGE ORAL at 17:44

## 2022-03-04 RX ADMIN — ONDANSETRON 4 MG: 2 INJECTION INTRAMUSCULAR; INTRAVENOUS at 07:45

## 2022-03-04 RX ADMIN — LIDOCAINE HYDROCHLORIDE 100 MG: 20 INJECTION, SOLUTION EPIDURAL; INFILTRATION; INTRACAUDAL; PERINEURAL at 07:34

## 2022-03-04 RX ADMIN — OXYCODONE HYDROCHLORIDE 10 MG: 10 TABLET ORAL at 13:15

## 2022-03-04 RX ADMIN — Medication 3000 MG: at 17:43

## 2022-03-04 RX ADMIN — Medication 3000 MG: at 07:40

## 2022-03-04 RX ADMIN — FENTANYL CITRATE 50 MCG: 50 INJECTION INTRAMUSCULAR; INTRAVENOUS at 09:25

## 2022-03-04 RX ADMIN — GLYCOPYRROLATE 0.2 MG: 0.2 INJECTION, SOLUTION INTRAMUSCULAR; INTRAVENOUS at 08:05

## 2022-03-04 RX ADMIN — BENZOCAINE AND MENTHOL 1 LOZENGE: 15; 3.6 LOZENGE ORAL at 16:58

## 2022-03-04 RX ADMIN — LISINOPRIL AND HYDROCHLOROTHIAZIDE 1 TABLET: 12.5; 1 TABLET ORAL at 13:18

## 2022-03-04 RX ADMIN — PROTAMINE SULFATE 2.5 MG: 10 INJECTION, SOLUTION INTRAVENOUS at 08:52

## 2022-03-04 RX ADMIN — MEXILETINE HYDROCHLORIDE 150 MG: 150 CAPSULE ORAL at 21:52

## 2022-03-04 RX ADMIN — OXYCODONE HYDROCHLORIDE 10 MG: 10 TABLET ORAL at 09:50

## 2022-03-04 RX ADMIN — SODIUM CHLORIDE: 9 INJECTION, SOLUTION INTRAVENOUS at 08:55

## 2022-03-04 RX ADMIN — PROTAMINE SULFATE 17.5 MG: 10 INJECTION, SOLUTION INTRAVENOUS at 08:54

## 2022-03-04 RX ADMIN — SUGAMMADEX 200 MG: 100 INJECTION, SOLUTION INTRAVENOUS at 08:55

## 2022-03-04 RX ADMIN — FENTANYL CITRATE 50 MCG: 50 INJECTION INTRAMUSCULAR; INTRAVENOUS at 09:37

## 2022-03-04 RX ADMIN — MEXILETINE HYDROCHLORIDE 150 MG: 150 CAPSULE ORAL at 13:18

## 2022-03-04 RX ADMIN — SODIUM CHLORIDE: 9 INJECTION, SOLUTION INTRAVENOUS at 07:20

## 2022-03-04 RX ADMIN — METOPROLOL SUCCINATE 50 MG: 50 TABLET, EXTENDED RELEASE ORAL at 20:22

## 2022-03-04 ASSESSMENT — PULMONARY FUNCTION TESTS
PIF_VALUE: 3
PIF_VALUE: 19
PIF_VALUE: 3
PIF_VALUE: 17
PIF_VALUE: 19
PIF_VALUE: 18
PIF_VALUE: 19
PIF_VALUE: 18
PIF_VALUE: 19
PIF_VALUE: 2
PIF_VALUE: 21
PIF_VALUE: 7
PIF_VALUE: 0
PIF_VALUE: 18
PIF_VALUE: 19
PIF_VALUE: 22
PIF_VALUE: 15
PIF_VALUE: 13
PIF_VALUE: 18
PIF_VALUE: 2
PIF_VALUE: 19
PIF_VALUE: 21
PIF_VALUE: 21
PIF_VALUE: 18
PIF_VALUE: 19
PIF_VALUE: 1
PIF_VALUE: 18
PIF_VALUE: 19
PIF_VALUE: 0
PIF_VALUE: 17
PIF_VALUE: 19
PIF_VALUE: 17
PIF_VALUE: 19
PIF_VALUE: 19
PIF_VALUE: 4
PIF_VALUE: 34
PIF_VALUE: 18
PIF_VALUE: 19
PIF_VALUE: 18
PIF_VALUE: 19
PIF_VALUE: 17
PIF_VALUE: 19
PIF_VALUE: 26
PIF_VALUE: 18
PIF_VALUE: 19
PIF_VALUE: 24
PIF_VALUE: 17
PIF_VALUE: 0
PIF_VALUE: 19
PIF_VALUE: 18
PIF_VALUE: 18
PIF_VALUE: 19
PIF_VALUE: 19
PIF_VALUE: 2
PIF_VALUE: 19
PIF_VALUE: 19
PIF_VALUE: 30
PIF_VALUE: 28
PIF_VALUE: 18
PIF_VALUE: 19
PIF_VALUE: 3
PIF_VALUE: 19
PIF_VALUE: 21
PIF_VALUE: 18
PIF_VALUE: 19
PIF_VALUE: 19
PIF_VALUE: 18
PIF_VALUE: 5
PIF_VALUE: 19
PIF_VALUE: 18
PIF_VALUE: 19
PIF_VALUE: 19
PIF_VALUE: 21
PIF_VALUE: 19
PIF_VALUE: 15
PIF_VALUE: 19
PIF_VALUE: 17
PIF_VALUE: 18
PIF_VALUE: 19
PIF_VALUE: 17
PIF_VALUE: 1
PIF_VALUE: 19
PIF_VALUE: 18
PIF_VALUE: 19
PIF_VALUE: 19
PIF_VALUE: 2
PIF_VALUE: 19

## 2022-03-04 ASSESSMENT — PAIN DESCRIPTION - FREQUENCY
FREQUENCY: CONTINUOUS

## 2022-03-04 ASSESSMENT — PAIN DESCRIPTION - ORIENTATION
ORIENTATION: RIGHT;LEFT
ORIENTATION: MID

## 2022-03-04 ASSESSMENT — PAIN DESCRIPTION - LOCATION
LOCATION: GROIN
LOCATION: THROAT

## 2022-03-04 ASSESSMENT — PAIN SCALES - GENERAL
PAINLEVEL_OUTOF10: 8
PAINLEVEL_OUTOF10: 7
PAINLEVEL_OUTOF10: 8
PAINLEVEL_OUTOF10: 1
PAINLEVEL_OUTOF10: 0
PAINLEVEL_OUTOF10: 8
PAINLEVEL_OUTOF10: 7
PAINLEVEL_OUTOF10: 3
PAINLEVEL_OUTOF10: 0
PAINLEVEL_OUTOF10: 6
PAINLEVEL_OUTOF10: 0
PAINLEVEL_OUTOF10: 2
PAINLEVEL_OUTOF10: 7

## 2022-03-04 ASSESSMENT — PAIN DESCRIPTION - ONSET
ONSET: GRADUAL
ONSET: GRADUAL

## 2022-03-04 ASSESSMENT — ENCOUNTER SYMPTOMS: SHORTNESS OF BREATH: 0

## 2022-03-04 ASSESSMENT — PAIN DESCRIPTION - DESCRIPTORS
DESCRIPTORS: SORE
DESCRIPTORS: CONSTANT
DESCRIPTORS: SORE
DESCRIPTORS: SORE

## 2022-03-04 ASSESSMENT — PAIN DESCRIPTION - PROGRESSION
CLINICAL_PROGRESSION: NOT CHANGED
CLINICAL_PROGRESSION: NOT CHANGED

## 2022-03-04 ASSESSMENT — LIFESTYLE VARIABLES: SMOKING_STATUS: 0

## 2022-03-04 ASSESSMENT — PAIN DESCRIPTION - PAIN TYPE
TYPE: SURGICAL PAIN

## 2022-03-04 ASSESSMENT — PAIN - FUNCTIONAL ASSESSMENT
PAIN_FUNCTIONAL_ASSESSMENT: PREVENTS OR INTERFERES SOME ACTIVE ACTIVITIES AND ADLS
PAIN_FUNCTIONAL_ASSESSMENT: PREVENTS OR INTERFERES SOME ACTIVE ACTIVITIES AND ADLS
PAIN_FUNCTIONAL_ASSESSMENT: 0-10

## 2022-03-04 NOTE — BRIEF OP NOTE
Brief Postoperative Note      Patient: María Lr  YOB: 1955  MRN: [id number]    Date of Procedure: 3/4/2022    Pre-Op Diagnosis: ABDOMINAL AORTIC ANEURYSM WITHOUT RUPTURE    Post-Op Diagnosis: Same       Procedure(s):  ENDOVASCULAR REPAIR OF ABDOMINAL AORTIC ANEURYSM    Surgeon(s):  Roberto Armstrong DO    Assistant:  Surgical Assistant: Jhonathan Romero    Anesthesia: General    Estimated Blood Loss (mL): less than 328     Complications: None    Specimens:   * No specimens in log *    Implants:  Implant Name Type Inv.  Item Serial No.  Lot No. LRB No. Used Action   GRAFT AAA ENDOPROSTHESIS MAIN BODY - [id number] Endografts GRAFT AAA ENDOPROSTHESIS MAIN BODY [id number] [employer]WD  N/A 1 Implanted   GRAFT EVAR L14CM [id number] CONTRALATERAL LEG FOR AAA EXCLUDER - [id number] Endografts GRAFT EVAR L14CM [id number] CONTRALATERAL LEG FOR AAA EXCLUDER [id number] [employer]  N/A 1 Implanted   GRAFT EVAR L11.5CM [id number] CONTRALATERAL LEG FOR AAA - [id number] Endografts GRAFT EVAR L11.5CM [id number] CONTRALATERAL LEG FOR AAA [id number] [employer]  N/A 1 Implanted         Drains:   Urethral Catheter Non-latex 16 fr (Active)       Findings: small type 2 endoleak    Electronically signed by Roberto Armstrong DO on 3/4/2022 at 8:55 AM

## 2022-03-04 NOTE — PLAN OF CARE
Problem: Falls - Risk of:  Goal: Will remain free from falls  Description: Will remain free from falls  Outcome: Met This Shift  Goal: Absence of physical injury  Description: Absence of physical injury  Outcome: Met This Shift     Problem: Pain:  Goal: Pain level will decrease  Description: Pain level will decrease  Outcome: Ongoing  Goal: Control of acute pain  Description: Control of acute pain  Outcome: Ongoing

## 2022-03-04 NOTE — ANESTHESIA PROCEDURE NOTES
Arterial Line:    An arterial line was placed in the pre-op for the following indication(s): continuous blood pressure monitoring and blood sampling needed. A 20 gauge (size), 1 and 3/8 inch (length), Arrow (type) catheter was placed, Seldinger technique not used, into the right radial artery, secured by Tegaderm and tape. Anesthesia type: Local  Local infiltration: Injection    Events:  patient tolerated procedure well with no complications.   3/4/2022 7:20 AM3/4/2022 7:29 AM  Anesthesiologist: Mervin Marrero MD  Performed: Anesthesiologist   Preanesthetic Checklist  Completed: patient identified, IV checked, site marked, risks and benefits discussed, surgical consent, monitors and equipment checked, pre-op evaluation, timeout performed, anesthesia consent given, oxygen available and patient being monitored

## 2022-03-04 NOTE — PROGRESS NOTES
1000: Report received from recovery RN, Hmoa Nixon. Patient is alert and oriented x4, pain is manageable at this time. VSS on 2L NC, tolerating PO fluids well. Saray to R radial, not transduced due to being positional. BP cuff in place. Morrison intact and draining. Bilateral surgical incisions noted, c/d/i. Ice packs in place to groins for pain. Family at the bedside, call light in reach. Denies needs.    Electronically signed by Levi Joseph RN on 3/4/2022 at 10:31 AM

## 2022-03-04 NOTE — OP NOTE
[address] [place] [address]                                OPERATIVE REPORT    PATIENT NAME: Edie Smith                   :        1955  MED REC NO:   [id number]                          ROOM:       4243  ACCOUNT NO:   [de-identified]                           ADMIT DATE: 2022  PROVIDER:     Arnold Cowden, DO    DATE OF PROCEDURE:  2022    PREOPERATIVE DIAGNOSIS:  Abdominal aortic aneurysm, greater than 5.5 cm. POSTOPERATIVE DIAGNOSIS:  Abdominal aortic aneurysm, greater than 5.5  cm. OPERATIONS PERFORMED:  1. Ultrasound-guided access to bilateral common femoral arteries. 2.  Percutaneous access and closure of large-bore sheaths, right was a  14-Liberian sheath, left 16-Liberian sheath. 3.  Abdominal aortic aneurysm repair using a [place] Conformable Excluder  endoprosthesis. 4.  A 23 mm x 14.5 mm x 12 cm proximal fixation followed by a 20-mm limb  on the left and 23-mm limb on the right. SURGEON:  Arnold Cowden, DO    ASSISTANT:  Richelle Lugo    ANESTHESIA:  General.    CONTRAST:  120 mL. COMPLICATIONS:  None apparent. ESTIMATED BLOOD LOSS:  Less than 100 mL. INDICATIONS:  This is a 71-year-old male patient who has a known history  of an aortic aneurysm that was not being followed regularly. Last time,  we had it seen and evaluated was five years ago and was 3.5 cm. 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 A PET scan demonstrated that his aortic  aneurysm has now grown to 7 cm. 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 Basically, there is a consensus that the most urgent thing to  do is to fix the aortic aneurysm. This would be followed by treatment  and evaluation and possibly surgical resection of the axillary mass with  adjunctive therapies. This would then be followed by the popliteal  artery aneurysm repair. This was discussed with his oncologist as well  as the patient who understood and agreed. From the perspective of an  aortic aneurysm repair that was percutaneous, all the risks, benefits,  and alternatives were clearly reviewed with the patient. He had good  landing zones to suggest that an endovascular repair would be  sufficient. Once again, the risks include pain, infection, bleeding,  embolization, thrombosis, rupture. The anesthesia risks include MI,  stroke, death. The patient gave written informed consent. OPERATIVE PROCEDURE:  The patient was brought into the operating room  and placed supine on the table. This was in the hybrid room. He got  preoperative antibiotics. The patient also underwent general  anesthesia. This was uncomplicated. Bilateral groins as well as the  entire abdomen were prepped and draped in the usual sterile fashion. A  time-out was called for indication, patient, and laterality. The common femoral artery was then visualized under ultrasound. Using  the micropuncture technique, I accessed the common femoral arteries and  I directly visualized the needle entering the vessel. I began on the  right side. We did a percutaneous access. We spread the tissue down to  the femoral artery to ensure ProGlide suture placement was accurate and  adequate. After dilating with a 6-Jamaican sheath, I placed two ProGlides  at the 10 and 2 position on the right side. This was followed by a  placement of a 14-Jamaican sheath. On the left side, we repeated the same  process with two ProGlides after percutaneous access, followed by  placement of a 16-Jamaican sheath. The patient was given 10,000 units of heparin in the conduct of his  procedure. Flush catheter was placed up on the right side. The [place]  Excluder Conformable endoprosthesis 23 mm in diameter on the proximal  neck was placed on the left side.   After shooting multiple pictures in  the cranial-caudal direction to identify good landing zone, we  identified the right renal artery as well as left renal artery. We  landed the graft just below this. After deploying this, we cannulated  the gate using a combination of Berenstein catheter and Glidewire. After this was done, we confirmed gate placement by placing a flush  catheter and spinning it within the endoprosthesis. After this, we did another series of pictures to make sure the proximal  fixation was adequate and it was also well below the renal arteries. The patient has a very long neck that did not taper; therefore, landing  anywhere would be satisfactory. Once the proximal neck was firmly  deployed, we deployed the right iliac limb. Once again, this iliac limb  was around close to 23 mm in diameter distally. It was 14 cm long. I  made sure to have the appropriate minimal overlap of the proximal  endoprosthesis. This was then followed by completion deployment of the  main body with removal, making sure that all the tip was removed without  obstructing the proximal fixation. We then proceeded to place the ipsilateral limb, which was 20 mm in  diameter, followed by 11.5 cm in length. I placed this within the graft  and with minimal overlap, deployed it. After this, we molded the entire  endograft using a [place] molding and occlusion balloon catheter. Once  this was done, we then shot an arteriogram, which demonstrated that we  had good proximal and distal fixation. There was no evidence of type 1A  or type 1B endoleak. There was no evidence of a type 3 endoleak. The  patient does have a small type 2 endoleak that appeared to be emanating  likely from either the DONTE or the lumbars. After we were satisfied with this, we then proceeded to remove the  catheters and we placed the dilators back into the sheaths. We then  closed using the ProGlide sutures. We did require an additional suture  on the left side.   Once again, there were no immediate complications and  the patient was hemostatic at the end of the procedure. He was given 20  mg of protamine. He exhibits a palpable dorsalis pedis pulses  bilaterally. He was extubated and taken to the ICU in stable condition. There were no immediate complications. I was present and performed all  critical aspects of this procedure. All sponge and needle counts were correct x2.         Laureano Moore DO    D: 03/04/2022 9:39:18       T: 03/04/2022 10:34:32     [email or web address]  Job#: [id number]     Doc#: [id number]    CC:

## 2022-03-04 NOTE — H&P
H&P Update     I have reviewed the history and physical and examined the patient and find no relevant changes. I have reviewed with the patient and/or family the risks, benefits, and alternatives to the procedure. I HAVE PRESENTED REASONABLE ALTERNATIVES TO THE PATIENT'S PROPOSED CARE, TREATMENT, AND SERVICES. THE DISCUSSION I HAVE DONE ENCOMPASSED RISKS, BENEFITS, AND SIDE EFFECTS RELATED TO THE ALTERNATIVES AND THE RISKS RELATED TO NOT RECEIVING THE PROPOSED CARE, TREATMENT, SERVICES. Patient:  Parker Drew   :   1955    Intended Procedure: EVAR    Vitals:    22 0621   BP: (!) 154/97   Pulse: 70   Resp: 18   Temp: 98.7 °F (37.1 °C)   SpO2: 97%       Nursing notes reviewed and agreed. Medications reviewed  Allergies:    Allergies   Allergen Reactions    Lipitor [Atorvastatin] Other (See Comments)         Post Procedure plan: CVU    Lamberto Sams DO, [facility]

## 2022-03-04 NOTE — ANESTHESIA PRE PROCEDURE
Department of Anesthesiology  Preprocedure Note       Name:  Tariq Quinonez   Age:  73 y.o.  :  1955                                          MRN:  [id number]         Date:  3/4/2022      Surgeon: Vanessa Tan):  Willian Bellamy DO    Procedure: Procedure(s):  ENDOVASCULAR REPAIR OF ABDOMINAL AORTIC ANEURYSM    Medications prior to admission:   Prior to Admission medications    Medication Sig Start Date End Date Taking? Authorizing Provider   lisinopril-hydroCHLOROthiazide (PRINZIDE;ZESTORETIC) 10-12.5 MG per tablet Take 1 tablet by mouth 2 times daily 22  Yes Julius Rodriguez MD   metoprolol succinate (TOPROL XL) 50 MG extended release tablet Take 1 tablet by mouth 2 times daily 22  Yes Julius Rodriguez MD   Mis Natural Products (Millie Jacklyn MSM FORMULA PO) Take 750 mg by mouth 2 times daily   Yes Historical Provider, MD   Cholecalciferol (VITAMIN D3) 50 MCG (2000) CAPS Take by mouth daily   Yes Historical Provider, MD   Nutritional Supplements (CHLORELLA-SPIRULINA COMPLEX PO) Take 6 tablets by mouth 2 times daily   Yes Historical Provider, MD   Flax OIL Take 1,650 mg by mouth 2 softgel in am, 1 in pm   Yes Historical Provider, MD   metoprolol tartrate (LOPRESSOR) 25 MG tablet 1 tab po as needed for palpitations 21  Yes ANTON May CNP   mexiletine (MEXITIL) 150 MG capsule TAKE 1 CAPSULE BY MOUTH TWICE A DAY 21  Yes ANTON Bradshaw CNP   clopidogrel (PLAVIX) 75 MG tablet TAKE 1 TABLET DAILY 17  Yes Dustin Lowry MD   aspirin 81 MG EC tablet Take 81 mg by mouth daily    Historical Provider, MD       Current medications:    No current facility-administered medications for this encounter. Allergies:     Allergies   Allergen Reactions    Lipitor [Atorvastatin] Other (See Comments)       Problem List:    Patient Active Problem List   Diagnosis Code    Ileus ([facility] K56.7    Paroxysmal VT ([facility] I47.2    Chest pain R07.9    CAD (coronary artery disease) I25.10    [facility] I47.2    Palpitations R00.2    Encounter for electronic analysis of reveal event recorder Z45.09    Paroxysmal atrial fibrillation (HCC) I48.0    Sleep apnea G47.30    AAA (abdominal aortic aneurysm) without rupture (HCC) I71.4       Past Medical History:        Diagnosis Date    Acid reflux     Surgey to have stomach wrapped, unable to reguritat    [facility] ([facility]     Arrhythmia 2012    runs of vtach    Hyperlipidemia     on fish oil    Hypertension     MI (myocardial infarction) ([facility]     x2       Past Surgical History:        Procedure Laterality Date    HERNIA REPAIR      INSERTABLE CARDIAC MONITOR      TONSILLECTOMY      UMBILICAL HERNIA REPAIR         Social History:    Social History     Tobacco Use    Smoking status: Former Smoker     Packs/day: 0.20     Years: 25.00     Pack years: 5.00     Types: Cigarettes     Quit date: 3/1/2010     Years since quittin.0    Smokeless tobacco: Never Used   Substance Use Topics    Alcohol use: No                                Counseling given: Not Answered      Vital Signs (Current):   Vitals:    22 1242 22 0621   BP:  (!) 154/97   Pulse:  70   Resp:  18   Temp:  98.7 °F (37.1 °C)   TempSrc:  Tympanic   SpO2:  97%   Weight: 270 lb (122.5 kg) 267 lb 11.2 oz (121.4 kg)   Height: 6' 1\" (1.854 m) 6' 1\" (1.854 m)                                              BP Readings from Last 3 Encounters:   22 (!) 154/97   22 123/86   22 128/84       NPO Status:                                                                                 BMI:   Wt Readings from Last 3 Encounters:   22 267 lb 11.2 oz (121.4 kg)   22 270 lb (122.5 kg)   22 269 lb (122 kg)     Body mass index is 35.32 kg/m².     CBC:   Lab Results   Component Value Date    WBC 5.2 2020    RBC 4.51 2020    HGB 14.7 2020    HCT 44.3 2020    MCV 98.1 2020    RDW 13.3 2020     02/11/2020       CMP:   Lab Results   Component Value Date     02/11/2020    K 5.4 02/11/2020    CL 97 02/11/2020    CO2 29 02/11/2020    BUN 13 02/11/2020    CREATININE 0.8 02/11/2020    GFRAA >60 02/11/2020    GFRAA >60 11/26/2012    AGRATIO 1.2 02/11/2020    LABGLOM >60 02/11/2020    GLUCOSE 99 02/11/2020    PROT 6.9 02/11/2020    PROT 7.3 01/25/2013    CALCIUM 9.3 02/11/2020    BILITOT 0.6 02/11/2020    ALKPHOS 64 02/11/2020    AST 22 02/11/2020    ALT 17 02/11/2020       POC Tests: No results for input(s): POCGLU, POCNA, POCK, POCCL, POCBUN, POCHEMO, POCHCT in the last 72 hours.     Coags:   Lab Results   Component Value Date    PROTIME 12.1 02/11/2020    INR 1.04 02/11/2020    APTT 29.7 11/25/2012       HCG (If Applicable): No results found for: PREGTESTUR, PREGSERUM, HCG, HCGQUANT     ABGs: No results found for: PHART, PO2ART, LRO4ZUZ, NIF6LFZ, BEART, C9CQCXJB     Type & Screen (If Applicable):  No results found for: LABABO, LABRH    Drug/Infectious Status (If Applicable):  No results found for: HIV, HEPCAB    COVID-19 Screening (If Applicable):   Lab Results   Component Value Date    COVID19 Not Detected 03/02/2022           Anesthesia Evaluation  Patient summary reviewed and Nursing notes reviewed no history of anesthetic complications:   Airway: Mallampati: III     Neck ROM: full  Mouth opening: > = 3 FB Dental:    (+) upper dentures      Pulmonary:   (+) sleep apnea:      (-) pneumonia, COPD, shortness of breath, recent URI and not a current smoker                           Cardiovascular:    (+) hypertension:, past MI:, CAD:, dysrhythmias: atrial fibrillation, hyperlipidemia    (-) pacemaker, valvular problems/murmurs and no pulmonary hypertension      Rhythm: regular                      Neuro/Psych:      (-) seizures, neuromuscular disease, TIA, CVA, headaches, psychiatric history and depression/anxiety            GI/Hepatic/Renal:   (+) GERD:,           Endo/Other:    (+) blood dyscrasia: anticoagulation therapy:., .                 Abdominal:             Vascular:   + PVD, aortic or cerebral, . ROS comment:  9 cm long fusiform infrarenal abdominal aortic aneurysm, maximum  transverse diameter of 6.9 cm.  Moderate intramural thrombus in the aneurysm,  with patent lumen of 5.6 x 3.4 cm    . Other Findings:           Anesthesia Plan      general     ASA 3       Induction: intravenous. arterial line  MIPS: Postoperative opioids intended and Prophylactic antiemetics administered. Anesthetic plan and risks discussed with patient. Use of blood products discussed with patient whom consented to blood products. Plan discussed with CRNA. Geeta East MD   3/4/2022        This pre-anesthesia assessment may be used as a history and physical.    DOS STAFF ADDENDUM:    Pt seen and examined, chart reviewed (including anesthesia, drug and allergy history). No interval changes to history and physical examination. Anesthetic plan, risks, benefits, alternatives, and personnel involved discussed with patient. Patient verbalized an understanding and agrees to proceed.       Geeta East MD  March 4, 2022  6:37 AM

## 2022-03-05 VITALS
SYSTOLIC BLOOD PRESSURE: 98 MMHG | RESPIRATION RATE: 18 BRPM | TEMPERATURE: 98.2 F | HEART RATE: 56 BPM | HEIGHT: 73 IN | BODY MASS INDEX: 36.26 KG/M2 | WEIGHT: 273.59 LBS | DIASTOLIC BLOOD PRESSURE: 81 MMHG | OXYGEN SATURATION: 95 %

## 2022-03-05 LAB
ANION GAP SERPL CALCULATED.3IONS-SCNC: 12 MMOL/L (ref 3–16)
BUN BLDV-MCNC: 16 MG/DL (ref 7–20)
CALCIUM SERPL-MCNC: 9.1 MG/DL (ref 8.3–10.6)
CHLORIDE BLD-SCNC: 101 MMOL/L (ref 99–110)
CO2: 24 MMOL/L (ref 21–32)
CREAT SERPL-MCNC: 0.9 MG/DL (ref 0.8–1.3)
GFR AFRICAN AMERICAN: >60
GFR NON-AFRICAN AMERICAN: >60
GLUCOSE BLD-MCNC: 129 MG/DL (ref 70–99)
HCT VFR BLD CALC: 41.7 % (ref 40.5–52.5)
HEMOGLOBIN: 14 G/DL (ref 13.5–17.5)
MCH RBC QN AUTO: 32.2 PG (ref 26–34)
MCHC RBC AUTO-ENTMCNC: 33.6 G/DL (ref 31–36)
MCV RBC AUTO: 95.8 FL (ref 80–100)
PDW BLD-RTO: 12.8 % (ref 12.4–15.4)
PLATELET # BLD: 208 K/UL (ref 135–450)
PMV BLD AUTO: 7.4 FL (ref 5–10.5)
POTASSIUM SERPL-SCNC: 4.3 MMOL/L (ref 3.5–5.1)
RBC # BLD: 4.35 M/UL (ref 4.2–5.9)
SODIUM BLD-SCNC: 137 MMOL/L (ref 136–145)
WBC # BLD: 10.6 K/UL (ref 4–11)

## 2022-03-05 PROCEDURE — 80048 BASIC METABOLIC PNL TOTAL CA: CPT

## 2022-03-05 PROCEDURE — 6360000002 HC RX W HCPCS: Performed by: STUDENT IN AN ORGANIZED HEALTH CARE EDUCATION/TRAINING PROGRAM

## 2022-03-05 PROCEDURE — 6370000000 HC RX 637 (ALT 250 FOR IP): Performed by: STUDENT IN AN ORGANIZED HEALTH CARE EDUCATION/TRAINING PROGRAM

## 2022-03-05 PROCEDURE — 85027 COMPLETE CBC AUTOMATED: CPT

## 2022-03-05 PROCEDURE — 94760 N-INVAS EAR/PLS OXIMETRY 1: CPT

## 2022-03-05 PROCEDURE — 36415 COLL VENOUS BLD VENIPUNCTURE: CPT

## 2022-03-05 PROCEDURE — 2580000003 HC RX 258: Performed by: STUDENT IN AN ORGANIZED HEALTH CARE EDUCATION/TRAINING PROGRAM

## 2022-03-05 RX ORDER — OXYCODONE HYDROCHLORIDE 5 MG/1
5 TABLET ORAL EVERY 4 HOURS PRN
Qty: 20 TABLET | Refills: 0 | Status: SHIPPED | OUTPATIENT
Start: 2022-03-05 | End: 2022-03-08

## 2022-03-05 RX ADMIN — OXYCODONE 5 MG: 5 TABLET ORAL at 08:48

## 2022-03-05 RX ADMIN — MEXILETINE HYDROCHLORIDE 150 MG: 150 CAPSULE ORAL at 09:25

## 2022-03-05 RX ADMIN — LISINOPRIL AND HYDROCHLOROTHIAZIDE 1 TABLET: 12.5; 1 TABLET ORAL at 09:25

## 2022-03-05 RX ADMIN — METOPROLOL SUCCINATE 50 MG: 50 TABLET, EXTENDED RELEASE ORAL at 09:25

## 2022-03-05 RX ADMIN — ASPIRIN 81 MG: 81 TABLET, COATED ORAL at 09:25

## 2022-03-05 RX ADMIN — BENZOCAINE AND MENTHOL 1 LOZENGE: 15; 3.6 LOZENGE ORAL at 04:50

## 2022-03-05 RX ADMIN — Medication 3000 MG: at 02:18

## 2022-03-05 RX ADMIN — OXYCODONE 5 MG: 5 TABLET ORAL at 02:43

## 2022-03-05 ASSESSMENT — PAIN SCALES - GENERAL
PAINLEVEL_OUTOF10: 4
PAINLEVEL_OUTOF10: 0
PAINLEVEL_OUTOF10: 4
PAINLEVEL_OUTOF10: 0

## 2022-03-05 NOTE — PROGRESS NOTES
@4020 Patient had 19 beat run of v-tach, converted back to sinus rhythm with PVCs with no intervention. Patient's scheduled dose of mexiletine 150mg and metoprolol 50mg administered. Patient reports to this RN that he has lopressor 25mg po PRN for palpitations at home. Call placed to on call vascular surgery. Call back received from Dr Bryan Bettencourt, updates provided by this RN. Order received for home dose of lopressor 25 mg PO PRN for palpitations.

## 2022-03-05 NOTE — PROGRESS NOTES
Vascular    POD #1 EVAR  Doing well. Pain controlled  No other complaints    AFVSS  abd soft, NT  Ext warm  Incision intact    Lab Results   Component Value Date    WBC 10.6 03/05/2022    HGB 14.0 03/05/2022    HCT 41.7 03/05/2022    MCV 95.8 03/05/2022     03/05/2022     I/P:  POD #1 EVAR  Resume home meds  Ok to discharge home today  Follow up with Dr. Juan Abebe in 2 weeks      Umberto Alberto M.D., FACS.  3/5/2022  1:13 PM

## 2022-03-05 NOTE — CARE COORDINATION
Discharge order noted. Chart reviewed. Patient IPTA. No discharge needs identified at this time. Plan is to return home with family.      Electronically signed by Polly Raphael RN MSN on 3/5/2022 at 1:34 PM

## 2022-03-05 NOTE — PROGRESS NOTES
Dr. Nitin Galeano at bedside. Okay for patient to go home. Discharge instructions reviewed to patient and spouse. All questions answered.  Pt wheeled to private car  Electronically signed by Theresa German RN on 3/5/2022 at 1:45 PM

## 2022-03-07 NOTE — DISCHARGE SUMMARY
Discharge Summary    Date: 3/7/2022  Patient Name: Nava Morales YOB: 1955 Age: 73 y.o. Admit Date: 3/4/2022  Discharge Date: 3/5/2022  Discharge Condition: Good    Admission Diagnosis  Abdominal aortic aneurysm without rupture (HCC) (I71.4); Abdominal aortic aneurysm (AAA) greater than 5.5 cm in diameter in male [facility]) (I71.4)     Discharge Diagnosis  Principal Problem: Abdominal aortic aneurysm (AAA) greater than 5.5 cm in diameter in male (HCC)Resolved Problems: * No resolved hospital problems. [facility] Stay  Narrative of Hospital Course: This is a 73year old male patient who underwent treatment with EVAR for a 7 cm aneurysm. This was uncomplicated. His recovery was uneventful. His labs were stable and he was deemed safe for discharge on POD#1. Consultants:  None    Surgeries/procedures Performed:       Treatments:   Surgery        Discharge Plan/Disposition:  Home    Hospital/Incidental Findings Requiring Follow Up:    Patient Instructions:    Diet: Regular Diet    Activity:No Heavy Lifting  For number of days (if applicable): Other Instructions:    Provider Follow-Up:   No follow-ups on file.      Significant Diagnostic Studies:    Recent Labs:  Admission on 03/04/2022, Discharged on 03/05/2022WBC                                         Date: 03/04/2022Value: 6.0         Ref range: 4.0 - 11.0 K/uL    Status: FinalRBC                                           Date: 03/04/2022Value: 4.73        Ref range: 4.20 - 5.90 M/uL   Status: FinalHemoglobin                                    Date: 03/04/2022Value: 15.1        Ref range: 13.5 - 17.5 g/dL   Status: FinalHematocrit                                    Date: 03/04/2022Value: 45.7        Ref range: 40.5 - 52.5 %      Status: FinalMCV                                           Date: 03/04/2022Value: 96.6        Ref range: 80.0 - 100.0 fL    Status: [address]                                           Date: 03/04/2022Value: 31.9 Ref range: 26.0 - 34.0 pg     Status: [address]                                          Date: 03/04/2022Value: 33.0        Ref range: 31.0 - 36.0 g/dL   Status: FinalRDW                                           Date: 03/04/2022Value: 13.1        Ref range: 12.4 - 15.4 %      Status: FinalPlatelets                                     Date: 03/04/2022Value: 253         Ref range: 135 - 450 K/uL     Status: FinalMPV                                           Date: 03/04/2022Value: 7.2         Ref range: 5.0 - 10.5 fL      Status: FinalSodium                                        Date: 03/04/2022Value: 138         Ref range: 136 - 145 mmol/L   Status: FinalPotassium reflex Magnesium                    Date: 03/04/2022Value: 4.2         Ref range: 3.5 - 5.1 mmol/L   Status: FinalChloride                                      Date: 03/04/2022Value: 100         Ref range: 99 - 110 mmol/L    Status: FinalCO2                                           Date: 03/04/2022Value: 26          Ref range: 21 - 32 mmol/L     Status: FinalAnion Gap                                     Date: 03/04/2022Value: 12          Ref range: 3 - 16             Status: FinalGlucose                                       Date: 03/04/2022Value: 114*        Ref range: 70 - 99 mg/dL      Status: FinalBUN                                           Date: 03/04/2022Value: 16          Ref range: 7 - 20 mg/dL       Status: FinalCREATININE                                    Date: 03/04/2022Value: 1.0         Ref range: 0.8 - 1.3 mg/dL    Status: FinalGFR Non-                      Date: 03/04/2022Value: >60         Ref range: >60                Status: Final              Comment: >60 mL/min/1.73m2 EGFR, calc. for ages 25 and older using theMDRD formula (not corrected for weight), is valid for stablerenal function. GFR                           Date: 03/04/2022Value: >60         Ref range: >60                Status: Final Comment: Chronic Kidney Disease: less than 60 ml/min/1.73 sq.m. Kidney Failure: less than 15 ml/min/1.73 sq. m. Results valid for patients 18 years and older. Calcium                                       Date: 03/04/2022Value: 9.4         Ref range: 8.3 - 10.6 mg/dL   Status: FinalABO/Rh                                        Date: 03/04/2022Value: O POS         Status: FinalAntibody Screen                               Date: 03/04/2022Value: NEG           Status: FinalVentricular Rate                              Date: 03/04/2022Value: 65          Ref range: BPM                Status: FinalAtrial Rate                                   Date: 03/04/2022Value: 65          Ref range: BPM                Status: FinalP-R Interval                                  Date: 03/04/2022Value: 224         Ref range: ms                 Status: FinalQRS Duration                                  Date: 03/04/2022Value: 92          Ref range: ms                 Status: FinalQ-T Interval                                  Date: 03/04/2022Value: 394         Ref range: ms                 Status: FinalQTc Calculation ([place]azett)                      Date: 03/04/2022Value: 409         Ref range: ms                 Status: FinalP Axis                                        Date: 03/04/2022Value: 19          Ref range: degrees            Status: FinalR Axis                                        Date: 03/04/2022Value: -17         Ref range: degrees            Status: FinalT Axis                                        Date: 03/04/2022Value: 4           Ref range: degrees            Status: FinalDiagnosis                                     Date: 03/04/2022Value: Sinus rhythm with 1st degree A-V blockInferior in*                     Status: [address] [facility]                                           Date: 03/04/2022Value: 6.7         Ref range: 4.0 - 11.0 K/uL    Status: FinalRB                                           Date: 03/04/2022Value: 4.34 Ref range: 4.20 - 5.90 M/uL   Status: FinalHemoglobin                                    Date: 03/04/2022Value: 13.6        Ref range: 13.5 - 17.5 g/dL   Status: FinalHematocrit                                    Date: 03/04/2022Value: 42.1        Ref range: 40.5 - 52.5 %      Status: FinalMCV                                           Date: 03/04/2022Value: 96.9        Ref range: 80.0 - 100.0 fL    Status: [address] [place]                                           Date: 03/04/2022Value: 31.4        Ref range: 26.0 - 34.0 pg     Status: [address]                                          Date: 03/04/2022Value: 32.4        Ref range: 31.0 - 36.0 g/dL   Status: FinalRDW                                           Date: 03/04/2022Value: 13.3        Ref range: 12.4 - 15.4 %      Status: FinalPlatelets                                     Date: 03/04/2022Value: 221         Ref range: 135 - 450 K/uL     Status: FinalMPV                                           Date: 03/04/2022Value: 7.2         Ref range: 5.0 - 10.5 fL      Status: FinalSodium                                        Date: 03/05/2022Value: 137         Ref range: 136 - 145 mmol/L   Status: FinalPotassium                                     Date: 03/05/2022Value: 4.3         Ref range: 3.5 - 5.1 mmol/L   Status: FinalChloride                                      Date: 03/05/2022Value: 101         Ref range: 99 - 110 mmol/L    Status: FinalCO2                                           Date: 03/05/2022Value: 24          Ref range: 21 - 32 mmol/L     Status: FinalAnion Gap                                     Date: 03/05/2022Value: 12          Ref range: 3 - 16             Status: FinalGlucose                                       Date: 03/05/2022Value: 129*        Ref range: 70 - 99 mg/dL      Status: FinalBUN                                           Date: 03/05/2022Value: 16          Ref range: 7 - 20 mg/dL       Status: [facility] Date: 03/05/2022Value: 0.9         Ref range: 0.8 - 1.3 mg/dL    Status: FinalGFR Non-                      Date: 03/05/2022Value: >60         Ref range: >60                Status: Final              Comment: >60 mL/min/1.73m2 EGFR, calc. for ages 25 and older using theMDRD formula (not corrected for weight), is valid for stablerenal function. GFR                           Date: 03/05/2022Value: >60         Ref range: >60                Status: Final              Comment: Chronic Kidney Disease: less than 60 ml/min/1.73 sq.m. Kidney Failure: less than 15 ml/min/1.73 sq. m. Results valid for patients 18 years and older. Calcium                                       Date: 03/05/2022Value: 9.1         Ref range: 8.3 - 10.6 mg/dL   Status: [address] [facility]                                           Date: 03/05/2022Value: 10.6        Ref range: 4.0 - 11.0 K/uL    Status: FinalRBC                                           Date: 03/05/2022Value: 4.35        Ref range: 4.20 - 5.90 M/uL   Status: FinalHemoglobin                                    Date: 03/05/2022Value: 14.0        Ref range: 13.5 - 17.5 g/dL   Status: FinalHematocrit                                    Date: 03/05/2022Value: 41.7        Ref range: 40.5 - 52.5 %      Status: FinalMCV                                           Date: 03/05/2022Value: 95.8        Ref range: 80.0 - 100.0 fL    Status: [address]                                           Date: 03/05/2022Value: 32.2        Ref range: 26.0 - 34.0 pg     Status: [address]                                          Date: 03/05/2022Value: 33.6        Ref range: 31.0 - 36.0 g/dL   Status: FinalRDW                                           Date: 03/05/2022Value: 12.8        Ref range: 12.4 - 15.4 %      Status: FinalPlatelets                                     Date: 03/05/2022Value: 208         Ref range: 135 - 450 K/uL     Status: Zah[facility] Castro Date: 03/05/2022Value: 7.4         Ref range: 5.0 - 10.5 fL      Status: Final------------    Radiology last 7 days:  No results found. [unfilled]    Discharge Medications    Discharge Medication List as of 3/5/2022  1:22 PMSTART taking these medicationsoxyCODONE (ROXICODONE) 5 MG immediate release tabletTake 1 tablet by mouth every 4 hours as needed for Pain for up to 3 days. , Disp-20 tablet, R-0Normal    Discharge Medication List as of 3/5/2022  1:22 PM    Discharge Medication List as of 3/5/2022  1:22 PMCONTINUE these medications which have NOT CHANGEDlisinopril-hydroCHLOROthiazide (PRINZIDE;ZESTORETIC) 10-12.5 MG per tabletTake 1 tablet by mouth 2 times daily, Disp-90 tablet, R-1Adjust Sigmetoprolol succinate (TOPROL XL) 50 MG extended release tabletTake 1 tablet by mouth 2 times daily, Disp-90 tablet, R-3Adjust SigMisc Natural Products (GLUCOSAMINE CHOND MSM FORMULA PO)Take 750 mg by mouth 2 times dailyHistorical MedCholecalciferol (VITAMIN D3) 50 MCG (2000 UT) CAPSTake by mouth dailyHistorical MedNutritional Supplements (CHLORELLA-SPIRULINA COMPLEX PO)Take 6 tablets by mouth 2 times dailyHistorical MedFlax OILTake 1,650 mg by mouth 2 softgel in am, 1 in pmHistorical Medmetoprolol tartrate (LOPRESSOR) 25 MG tablet1 tab po as needed for palpitations, Disp-30 tablet, R-1Normalmexiletine (MEXITIL) 150 MG capsuleTAKE 1 CAPSULE BY MOUTH TWICE A DAY, Disp-180 capsule, R-1Normalclopidogrel (PLAVIX) 75 MG tabletTAKE 1 TABLET DAILY, Disp-30 tablet, R-11Normalaspirin 81 MG EC tabletTake 81 mg by mouth dailyHistorical Med    Discharge Medication List as of 3/5/2022  1:22 PM    Time Spent on Discharge:E] minutes were spent in patient examination, evaluation, counseling as well as medication reconciliation, prescriptions for required medications, discharge plan, and follow up.     Electronically signed by Larissa Francis DO on 3/7/22 at 3:49 PM EST

## 2022-03-09 ENCOUNTER — TELEPHONE (OUTPATIENT)
Dept: VASCULAR SURGERY | Age: 67
End: 2022-03-09

## 2022-03-11 NOTE — PROGRESS NOTES
[facility] ENDOSCOPY COLONOSCOPY PRE-OPERATIVE INSTRUCTIONS    Procedure date_3/16/22________  Arrival time__1200__________          Surgery time____1300________       Clear liquids the day before the procedure. Do not eat or drink anything within 5 hours of your procedure. This includes water chewing gum, mints and ice chips. You may brush your teeth and gargle the morning of your surgery, but do not swallow the water    You may be asked to stop blood thinners such as Coumadin, Plavix, Fragmin, Lovenox, etc., or any anti-inflammatories such as:  Aspirin, Ibuprofen, Advil, Naproxen prior to your procedure. We also ask that you stop any OTC medications such as fish oil, vitamin E, glucosamine, garlic, Multivitamins, COQ 10, etc.    You must make arrangements for a responsible adult to arrive with you and stay in our waiting area during your procedure. They will also need to take you home after your procedure. For your safety you will not be allowed to leave alone or drive yourself home. Also for your safety, it is strongly suggested that someone stay with you the first 24 hours after your procedure. For your comfort, please wear simple loose fitting clothing to the center. Please do not bring valuables. If you have a living will and a durable power of  for healthcare, please bring in a copy. You will need to bring a photo ID and insurance card    Our goal is to provide you with excellent care so if you have any questions, please contact us at the [facility] at [phone]         Please note these are generalized instructions for all colonoscopy cases, you may be provided with more specific instructions if necessaryPreoperative Screening for Elective Surgery/Invasive Procedures While COVID-19 present in the community     Have you had any of the following symptoms?   o Fever, chills  o Cough  o Shortness of breath  o Muscle aches/pain  o Diarrhea  o Abdominal pain, nausea, vomiting  o Loss or decrease in taste and / or smell   Risk of Exposure  o Have you recently been hospitalized for COVID-19 or flu-like illness, if so when?  o Recently diagnosed with COVID-19, if so when?  o Recently tested for COVID-19, if so when?  o Have you been in close contact with a person or family member who currently has or recently had COVID-23? If yes, when and in what context?  o Do you live with anybody who in the last 14 days has had fever, chills, shortness of breath, muscle aches, flu-like illness?  o Do you have any close contacts or family members who are currently in the hospital for COVID-19 or flu-like illness? If yes, assess recent close contact with this person. Indicate if the patient has a positive screen by answering yes to one or more of the above questions. Patients who test positive or screen positive prior to surgery or on the day of surgery should be evaluated in conjunction with the surgeon/proceduralist/anesthesiologist to determine the urgency of the procedure. No to all question above.   Patient is vaccinated as per requirement of these procedures

## 2022-03-14 ENCOUNTER — ANESTHESIA EVENT (OUTPATIENT)
Dept: ENDOSCOPY | Age: 67
End: 2022-03-14
Payer: COMMERCIAL

## 2022-03-14 NOTE — PROGRESS NOTES
Discussed recent EVAR and cardiac history of patient with Dr. Song from anesthesia. Okayed for procedure to be done at [facility].

## 2022-03-16 ENCOUNTER — HOSPITAL ENCOUNTER (OUTPATIENT)
Age: 67
Setting detail: OUTPATIENT SURGERY
Discharge: HOME | End: 2022-03-16
Attending: INTERNAL MEDICINE | Admitting: INTERNAL MEDICINE
Payer: COMMERCIAL

## 2022-03-16 ENCOUNTER — ANESTHESIA (OUTPATIENT)
Dept: ENDOSCOPY | Age: 67
End: 2022-03-16
Payer: COMMERCIAL

## 2022-03-16 VITALS
HEART RATE: 57 BPM | DIASTOLIC BLOOD PRESSURE: 81 MMHG | TEMPERATURE: 96.5 F | RESPIRATION RATE: 16 BRPM | SYSTOLIC BLOOD PRESSURE: 164 MMHG | BODY MASS INDEX: 36.16 KG/M2 | OXYGEN SATURATION: 100 % | WEIGHT: 267 LBS | HEIGHT: 72 IN

## 2022-03-16 VITALS
OXYGEN SATURATION: 98 % | RESPIRATION RATE: 17 BRPM | SYSTOLIC BLOOD PRESSURE: 79 MMHG | DIASTOLIC BLOOD PRESSURE: 47 MMHG

## 2022-03-16 PROCEDURE — 2580000003 HC RX 258: Performed by: NURSE ANESTHETIST, CERTIFIED REGISTERED

## 2022-03-16 PROCEDURE — 88342 IMHCHEM/IMCYTCHM 1ST ANTB: CPT

## 2022-03-16 PROCEDURE — 2500000003 HC RX 250 WO HCPCS: Performed by: NURSE ANESTHETIST, CERTIFIED REGISTERED

## 2022-03-16 PROCEDURE — 2709999900 HC NON-CHARGEABLE SUPPLY: Performed by: INTERNAL MEDICINE

## 2022-03-16 PROCEDURE — 3609012400 HC EGD TRANSORAL BIOPSY SINGLE/MULTIPLE: Performed by: INTERNAL MEDICINE

## 2022-03-16 PROCEDURE — 2580000003 HC RX 258: Performed by: ANESTHESIOLOGY

## 2022-03-16 PROCEDURE — 3609010300 HC COLONOSCOPY W/BIOPSY SINGLE/MULTIPLE: Performed by: INTERNAL MEDICINE

## 2022-03-16 PROCEDURE — 88305 TISSUE EXAM BY PATHOLOGIST: CPT

## 2022-03-16 PROCEDURE — 7100000011 HC PHASE II RECOVERY - ADDTL 15 MIN: Performed by: INTERNAL MEDICINE

## 2022-03-16 PROCEDURE — 6360000002 HC RX W HCPCS: Performed by: NURSE ANESTHETIST, CERTIFIED REGISTERED

## 2022-03-16 PROCEDURE — 3700000001 HC ADD 15 MINUTES (ANESTHESIA): Performed by: INTERNAL MEDICINE

## 2022-03-16 PROCEDURE — 3700000000 HC ANESTHESIA ATTENDED CARE: Performed by: INTERNAL MEDICINE

## 2022-03-16 PROCEDURE — 7100000010 HC PHASE II RECOVERY - FIRST 15 MIN: Performed by: INTERNAL MEDICINE

## 2022-03-16 RX ORDER — SODIUM CHLORIDE 0.9 % (FLUSH) 0.9 %
5-40 SYRINGE (ML) INJECTION PRN
Status: DISCONTINUED | OUTPATIENT
Start: 2022-03-16 | End: 2022-03-16 | Stop reason: HOSPADM

## 2022-03-16 RX ORDER — PROPOFOL 10 MG/ML
INJECTION, EMULSION INTRAVENOUS PRN
Status: DISCONTINUED | OUTPATIENT
Start: 2022-03-16 | End: 2022-03-16 | Stop reason: SDUPTHER

## 2022-03-16 RX ORDER — SODIUM CHLORIDE 0.9 % (FLUSH) 0.9 %
5-40 SYRINGE (ML) INJECTION EVERY 12 HOURS SCHEDULED
Status: DISCONTINUED | OUTPATIENT
Start: 2022-03-16 | End: 2022-03-16 | Stop reason: HOSPADM

## 2022-03-16 RX ORDER — SODIUM CHLORIDE 9 MG/ML
25 INJECTION, SOLUTION INTRAVENOUS PRN
Status: DISCONTINUED | OUTPATIENT
Start: 2022-03-16 | End: 2022-03-16 | Stop reason: HOSPADM

## 2022-03-16 RX ORDER — OMEPRAZOLE 20 MG/1
20 CAPSULE, DELAYED RELEASE ORAL
Qty: 90 CAPSULE | Refills: 1 | Status: SHIPPED | OUTPATIENT
Start: 2022-03-16

## 2022-03-16 RX ORDER — LIDOCAINE HYDROCHLORIDE 20 MG/ML
INJECTION, SOLUTION EPIDURAL; INFILTRATION; INTRACAUDAL; PERINEURAL PRN
Status: DISCONTINUED | OUTPATIENT
Start: 2022-03-16 | End: 2022-03-16 | Stop reason: SDUPTHER

## 2022-03-16 RX ORDER — SODIUM CHLORIDE 9 MG/ML
INJECTION, SOLUTION INTRAVENOUS CONTINUOUS PRN
Status: DISCONTINUED | OUTPATIENT
Start: 2022-03-16 | End: 2022-03-16 | Stop reason: SDUPTHER

## 2022-03-16 RX ADMIN — PROPOFOL 100 MG: 10 INJECTION, EMULSION INTRAVENOUS at 13:38

## 2022-03-16 RX ADMIN — SODIUM CHLORIDE: 9 INJECTION, SOLUTION INTRAVENOUS at 13:43

## 2022-03-16 RX ADMIN — PROPOFOL 100 MG: 10 INJECTION, EMULSION INTRAVENOUS at 13:18

## 2022-03-16 RX ADMIN — SODIUM CHLORIDE: 9 INJECTION, SOLUTION INTRAVENOUS at 13:12

## 2022-03-16 RX ADMIN — PROPOFOL 100 MG: 10 INJECTION, EMULSION INTRAVENOUS at 13:23

## 2022-03-16 RX ADMIN — PROPOFOL 100 MG: 10 INJECTION, EMULSION INTRAVENOUS at 13:43

## 2022-03-16 RX ADMIN — PROPOFOL 100 MG: 10 INJECTION, EMULSION INTRAVENOUS at 13:27

## 2022-03-16 RX ADMIN — SODIUM CHLORIDE 25 ML: 9 INJECTION, SOLUTION INTRAVENOUS at 13:12

## 2022-03-16 RX ADMIN — LIDOCAINE HYDROCHLORIDE 60 MG: 20 INJECTION, SOLUTION EPIDURAL; INFILTRATION; INTRACAUDAL; PERINEURAL at 13:18

## 2022-03-16 RX ADMIN — PROPOFOL 100 MG: 10 INJECTION, EMULSION INTRAVENOUS at 13:30

## 2022-03-16 RX ADMIN — PROPOFOL 100 MG: 10 INJECTION, EMULSION INTRAVENOUS at 13:35

## 2022-03-16 RX ADMIN — PROPOFOL 100 MG: 10 INJECTION, EMULSION INTRAVENOUS at 13:47

## 2022-03-16 ASSESSMENT — LIFESTYLE VARIABLES: SMOKING_STATUS: 0

## 2022-03-16 ASSESSMENT — PAIN SCALES - GENERAL
PAINLEVEL_OUTOF10: 0

## 2022-03-16 ASSESSMENT — PAIN - FUNCTIONAL ASSESSMENT: PAIN_FUNCTIONAL_ASSESSMENT: 0-10

## 2022-03-16 ASSESSMENT — ENCOUNTER SYMPTOMS: SHORTNESS OF BREATH: 0

## 2022-03-16 NOTE — OP NOTE
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 These include, but are not limited to hemorrhage, bleeding, post procedural pain, perforation, phlebitis, aspiration, hypotension, hypoxia, cardiovascular events such as arryhthmia, and possibly death. Additionally, the possibility of missed lesions was conveyed to patient. Anesthesia:  MAC    Procedure: An informed consent was obtained from the patient after explanation of indications, benefits, possible risks and complications of the procedure. The patient was then taken to the endoscopy suite, placed in the left lateral position, and the above IV anesthesia was administered. The Olympus gastroscope was placed in the patient's mouth and advanced under direct vision through the esophagus and stomach to the second portion of the duodenum. Duodenum had erosions in the bulb but was otherwise normal including the Ampulla of Vater.  Moderate erosive gastritis seen scattered throughout stomach and prior Nissen fundoplication seen on retroflexion which appeared intact. Six gastric biopsies were obtained for H pylori evaluation including two in the body, two in the incisura and two in the antrum. Irregular Z line biopsied for Valencia's esophagus (C0M1). Otherwise normal esophagus, no mass. Gastroscope was withdrawn as the upper GI tract was decompressed. Following esophagogastroduodenoscopy the bed was turned 180 degrees and then colonoscopy was performed. The scope was advanced atraumatically to the terminal ileum. Prep quality was good. Digital rectal exam showed external hemorrhoids. There was extensive pancolonic diverticulosis. A diminutive polyp in the transverse colon was resected with cold forceps. Semiprone position was required to achieve cecal intubation given looping. Retroflexion in the rectum showed internal hemorrhoids. The patient was decompressed as the colonoscope was withdrawn. All instruments were removed. The patient tolerated the procedure well and was taken to the PACU in good condition. 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    Repeat colonoscopy in 5 years  High fiber diet  Omeprazole daily  May restart Plavix tomorrow      Lilia Cobian MD  [address]  3/16/2022  [phone]

## 2022-03-16 NOTE — ANESTHESIA PRE PROCEDURE
Department of Anesthesiology  Preprocedure Note       Name:  Cata Herbert   Age:  73 y.o.  :  1955                                          MRN:  [id number]         Date:  3/16/2022      Surgeon: Emily Hicks):  Iman Castaneda MD    Procedure: Procedure(s):  COLONOSCOPY DIAGNOSTIC  EGD ESOPHAGOGASTRODUODENOSCOPY    Medications prior to admission:   Prior to Admission medications    Medication Sig Start Date End Date Taking?  Authorizing Provider   Metoprolol Succinate 50 MG CS24 Take by mouth   Yes Historical Provider, MD   aspirin 81 MG EC tablet Take 81 mg by mouth daily   Yes Historical Provider, MD   lisinopril-hydroCHLOROthiazide (PRINZIDE;ZESTORETIC) 10-12.5 MG per tablet Take 1 tablet by mouth 2 times daily 22  Yes Patience Tucker MD   Misc Natural Products (Rick[place] Norwegian MSM FORMULA PO) Take 750 mg by mouth 2 times daily   Yes Historical Provider, MD   Cholecalciferol (VITAMIN D3) 50 MCG (2000) CAPS Take by mouth daily   Yes Historical Provider, MD   Nutritional Supplements (CHLORELLA-SPIRULINA COMPLEX PO) Take 6 tablets by mouth 2 times daily   Yes Historical Provider, MD   Flax OIL Take 1,650 mg by mouth 2 softgel in am, 1 in pm   Yes Historical Provider, MD   mexiletine (MEXITIL) 150 MG capsule TAKE 1 CAPSULE BY MOUTH TWICE A DAY 21  Yes ANTON Alexis CNP   clopidogrel (PLAVIX) 75 MG tablet TAKE 1 TABLET DAILY 17  Yes Braulio Reyes MD   metoprolol tartrate (LOPRESSOR) 25 MG tablet 1 tab po as needed for palpitations 21   ANTON Carrion CNP       Current medications:    Current Facility-Administered Medications   Medication Dose Route Frequency Provider Last Rate Last Admin    sodium chloride flush 0.9 % injection 5-40 mL  5-40 mL IntraVENous 2 times per day Justin Wong MD        sodium chloride flush 0.9 % injection 5-40 mL  5-40 mL IntraVENous PRN Justin Wong MD        0.9 % sodium chloride infusion  25 mL IntraVENous PRN Justin Wong MD Allergies: Allergies   Allergen Reactions    Lipitor [Atorvastatin] Other (See Comments)       Problem List:    Patient Active Problem List   Diagnosis Code    Ileus ([facility] K56.7    Paroxysmal VT ([facility] I47.2    Chest pain R07.9    CAD (coronary artery disease) I25.10    V-tach ([facility] I47.2    Palpitations R00.2    Encounter for electronic analysis of reveal event recorder Z45.09    Paroxysmal atrial fibrillation ([facility] I48.0    Sleep apnea G47.30    AAA (abdominal aortic aneurysm) without rupture (HCC) I71.4    Abdominal aortic aneurysm (AAA) greater than 5.5 cm in diameter in male [facility]) I71.4       Past Medical History:        Diagnosis Date    Acid reflux     Surgey to have stomach wrapped, unable to reguritat    Aortic aneurysm ([facility]     Arrhythmia 2012    runs of vtach    Hyperlipidemia     on fish oil    Hypertension     MI (myocardial infarction) ([facility]     x2       Past Surgical History:        Procedure Laterality Date    ABDOMINAL AORTIC ANEURYSM REPAIR, ENDOVASCULAR N/A 3/4/2022    ENDOVASCULAR REPAIR OF ABDOMINAL AORTIC ANEURYSM performed by Pia Ocampo DO at [place]      TONSILLECTOMY      UMBILICAL HERNIA REPAIR         Social History:    Social History     Tobacco Use    Smoking status: Former Smoker     Packs/day: 0.20     Years: 25.00     Pack years: 5.00     Types: Cigarettes     Quit date: 3/1/2010     Years since quittin.0    Smokeless tobacco: Never Used   Substance Use Topics    Alcohol use:  No                                Counseling given: Not Answered      Vital Signs (Current):   Vitals:    22 1520 22 1249   BP:  (!) 160/73   Pulse:  61   Resp:  16   Temp:  97.2 °F (36.2 °C)   SpO2:  100%   Weight: 263 lb (119.3 kg) 267 lb (121.1 kg)   Height: 6' 1\" (1.854 m) 6' (1.829 m)                                              BP Readings from Last 3 Encounters:   22 (!) 160/73 03/05/22 98/81   03/04/22 127/84       NPO Status: Time of last liquid consumption: 0900                        Time of last solid consumption: 2000                        Date of last liquid consumption: 03/16/22                        Date of last solid food consumption: 03/14/22    BMI:   Wt Readings from Last 3 Encounters:   03/16/22 267 lb (121.1 kg)   03/05/22 273 lb 9.5 oz (124.1 kg)   02/28/22 270 lb (122.5 kg)     Body mass index is 36.21 kg/m². CBC:   Lab Results   Component Value Date    WBC 10.6 03/05/2022    RBC 4.35 03/05/2022    HGB 14.0 03/05/2022    HCT 41.7 03/05/2022    MCV 95.8 03/05/2022    RDW 12.8 03/05/2022     03/05/2022       CMP:   Lab Results   Component Value Date     03/05/2022    K 4.3 03/05/2022    K 4.2 03/04/2022     03/05/2022    CO2 24 03/05/2022    BUN 16 03/05/2022    CREATININE 0.9 03/05/2022    GFRAA >60 03/05/2022    GFRAA >60 11/26/2012    AGRATIO 1.2 02/11/2020    LABGLOM >60 03/05/2022    GLUCOSE 129 03/05/2022    PROT 6.9 02/11/2020    PROT 7.3 01/25/2013    CALCIUM 9.1 03/05/2022    BILITOT 0.6 02/11/2020    ALKPHOS 64 02/11/2020    AST 22 02/11/2020    ALT 17 02/11/2020       POC Tests: No results for input(s): POCGLU, POCNA, POCK, POCCL, POCBUN, POCHEMO, POCHCT in the last 72 hours.     Coags:   Lab Results   Component Value Date    PROTIME 12.1 02/11/2020    INR 1.04 02/11/2020    APTT 29.7 11/25/2012       HCG (If Applicable): No results found for: PREGTESTUR, PREGSERUM, HCG, HCGQUANT     ABGs: No results found for: PHART, PO2ART, BWR5TDG, LJS8ESD, BEART, H1VIWFKP     Type & Screen (If Applicable):  No results found for: LABABO, LABRH    Drug/Infectious Status (If Applicable):  No results found for: HIV, HEPCAB    COVID-19 Screening (If Applicable):   Lab Results   Component Value Date    COVID19 Not Detected 03/02/2022           Anesthesia Evaluation   no history of anesthetic complications:   Airway: Mallampati: III     Neck ROM: full  Mouth opening: > = 3 FB Dental:    (+) upper dentures  Comment: Upper dentures adheased well per patient, difficult to remove plate    Pulmonary: breath sounds clear to auscultation  (+) sleep apnea:      (-) pneumonia, COPD, shortness of breath, recent URI, rhonchi, wheezes, rales and not a current smoker                           Cardiovascular:  Exercise tolerance: no interval change, No restrictions following EVAR  (+) hypertension:, past MI (x2):, CAD:, dysrhythmias: atrial fibrillation and ventricular tachycardia, hyperlipidemia    (-) pacemaker, valvular problems/murmurs and no pulmonary hypertension      Rhythm: regular                   ROS comment: Most recent cardiology note reports good functional capacity, significant intentional weight loss with exercise    Echocardiogram:  Impression:  Normal contraction of a normal-sized left ventricle with  an estimated ejection fraction by visual inspection of 55%. No specific abnormality of interventricular septal motion. Mitral and tricuspid regurgitation.  The right ventricular systolic pressure is 21 mmHg.  This is a technically suboptimal echo and subtle wall motion changes might not be apparent on this study. Neuro/Psych:      (-) seizures, TIA, CVA and depression/anxiety            GI/Hepatic/Renal:   (+) GERD (s/p Nissen):,      (-) liver disease, no renal disease and no morbid obesity      ROS comment: PET CT:  1.  Rounded FDG avid mass within the lateral left breast measuring 4.4 x 5.1 cm with peripheral FDG avidity and a maximum SUV of 26.8, consistent with biopsy-proven squamous cell carcinoma. 2.  Subcentimeter mildly FDG avid nodules along the inferior aspect of the left breast mass suggesting small satellite lesions or metastatic nodes. 3.  Small area of FDG avidity midline along the abdominal wall with a maximum SUV of 8.2, suspicious for metastatic disease.    4.  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 .   Endo/Other:    (+) blood dyscrasia (DAPT; has not taken plavix since before EVAR, plan for axillary biopsies / resection before resuming, possible popliteal aneurysm repair pending with Dr. Dinah Macias): anticoagulation therapy:., malignancy/cancer (endoscopy for metastic squamous cell carcinoma: identify primary; no history of chemo/radiaiton). (-) diabetes mellitus               Abdominal:   (+) obese,     Abdomen: soft. Vascular:   + PVD, aortic or cerebral, . ROS comment: s/p EVAR: 9 cm long fusiform infrarenal abdominal aortic aneurysm, maximum transverse diameter of 6.9 cm. Moderate intramural thrombus in the aneurysm, with patent lumen of 5.6 x 3.4 cm    . Other Findings: Motor / sensory grossly intact in bilateral lower extremities. Anesthesia Plan      MAC     ASA 3     (NPO appropriate; denies active nausea / reflux. Waiting to resume plavix until after series of planned procedures.)  Induction: intravenous. Anesthetic plan and risks discussed with patient. Plan discussed with CRNA. This pre-anesthesia assessment may be used as a history and physical.    DOS STAFF ADDENDUM:    Pt seen and examined, chart reviewed (including anesthesia, drug and allergy history). No interval changes to history and physical examination. Anesthetic plan, risks, benefits, alternatives, and personnel involved discussed with patient. Patient verbalized an understanding and agrees to proceed.       Fiona Montero MD  March 16, 2022  12:57 PM

## 2022-03-16 NOTE — H&P
Housing Stability:     Unable to Pay for Housing in the Last Year: Not on file    Number of Places Lived in the Last Year: Not on file    Unstable Housing in the Last Year: Not on file      Family History:       Problem Relation Age of Onset    Other Father     Other Sister     Heart Disease Neg Hx         PHYSICAL EXAM:      BP (!) 160/73   Pulse 61   Temp 97.2 °F (36.2 °C)   Resp 16   Ht 6' (1.829 m)   Wt 267 lb (121.1 kg)   SpO2 100%   BMI 36.21 kg/m²  I        Heart:  Normal apical impulse, regular rate and rhythm, normal S1 and S2, no S3 or S4, and no murmur noted    Lungs:  No increased work of breathing, good air exchange, clear to auscultation bilaterally, no crackles or wheezing    Abdomen:  No scars, normal bowel sounds, soft, non-distended, non-tender, no masses palpated, no hepatosplenomegally      ASA Class  ASA 3 - Patient with moderate systemic disease with functional limitations    Mallampati Class: II      ASSESSMENT AND PLAN:    1. Patient is a suitable candidate for endoscopic procedure and attendant anesthesia  2. Risks, benefits, alternatives of procedure discussed in detail with patient including risks of bleeding, infection, perforation, risks of sedation, risks of missed lesions. The patient wishes to proceed.

## 2022-03-16 NOTE — ANESTHESIA POSTPROCEDURE EVALUATION
Department of Anesthesiology  Postprocedure Note    Patient: Shelia Iglesias  MRN: [id number]  YOB: 1955  Date of evaluation: 3/16/2022  Time:  2:24 PM     Procedure Summary     Date: 03/16/22 Room / Location: [facility] [address] [place] [address]    Anesthesia Start: 4079 Anesthesia Stop: 0194    Procedures:       COLONOSCOPY DIAGNOSTIC (N/A )      EGD BIOPSY (N/A ) Diagnosis:       Squamous cell carcinoma, metastatic (HCC)      (Squamous cell carcinoma, metastatic)    Surgeons: Tabby Sawyer MD Responsible Provider: Evette Bray MD    Anesthesia Type: MAC ASA Status: 3          Anesthesia Type: MAC    May Phase I: May Score: 10    May Phase II: May Score: 10    Last vitals: Reviewed and per EMR flowsheets. Anesthesia Post Evaluation    Patient location during evaluation: PACU  Patient participation: complete - patient participated  Level of consciousness: awake and alert  Airway patency: patent  Nausea & Vomiting: no nausea and no vomiting  Complications: no  Cardiovascular status: hemodynamically stable and blood pressure returned to baseline  Respiratory status: spontaneous ventilation, nonlabored ventilation and room air  Hydration status: stable  Comments: Intraoperative results discussed with Dr. Angelita Ulloa. Multiple biopsies taken. Mr. Grand mathis resting comfortably following procedure. Appropriate for discharge home with  / significant other, who is present at bedside.       Evette Bray MD

## 2022-03-21 ENCOUNTER — TELEPHONE (OUTPATIENT)
Dept: VASCULAR SURGERY | Age: 67
End: 2022-03-21

## 2022-03-21 NOTE — TELEPHONE ENCOUNTER
Message left with Dr Vickie Carrel office for clearance to proceed with repair of popliteal aneurysm.  [phone]

## 2022-03-24 ENCOUNTER — TELEPHONE (OUTPATIENT)
Dept: SURGERY | Age: 67
End: 2022-03-24

## 2022-03-24 ENCOUNTER — OFFICE VISIT (OUTPATIENT)
Dept: VASCULAR SURGERY | Age: 67
End: 2022-03-24
Payer: COMMERCIAL

## 2022-03-24 VITALS — SYSTOLIC BLOOD PRESSURE: 138 MMHG | DIASTOLIC BLOOD PRESSURE: 84 MMHG

## 2022-03-24 DIAGNOSIS — I72.4 ANEURYSM OF LEFT POPLITEAL ARTERY (HCC): ICD-10-CM

## 2022-03-24 DIAGNOSIS — I71.40 AAA (ABDOMINAL AORTIC ANEURYSM) WITHOUT RUPTURE: Primary | ICD-10-CM

## 2022-03-24 PROCEDURE — 99024 POSTOP FOLLOW-UP VISIT: CPT | Performed by: STUDENT IN AN ORGANIZED HEALTH CARE EDUCATION/TRAINING PROGRAM

## 2022-03-24 ASSESSMENT — ENCOUNTER SYMPTOMS
BACK PAIN: 0
ABDOMINAL PAIN: 0
SHORTNESS OF BREATH: 0

## 2022-03-24 NOTE — TELEPHONE ENCOUNTER
Spoke with patient regarding scheduled Angiogram procedure on 03- with Dr. Vijay Ty. Patient is asked to arrive by 6:30 AM @ [facility] after midnight. May take any Heart or Blood Pressure medication with a small sip of water to wash them down. You may continue taking your Aspirin and Plavix (per Dr. Vijay Ty). You will need someone to drive you home following this procedure. Please bring a Photo ID & Insurance card with you, and check-in at the [address] [place] [address] [address] down the right-hand hallway on the first floor. Angiogram is scheduled to start at approx. 8:00 AM and should take approx. 90 - 120 minutes. If the hospital needs any further information, someone will give you a call. Patient expressed a verbal understanding of these instructions and had no further questions at this time. Call ended.

## 2022-03-24 NOTE — PROGRESS NOTES
Connor Edwards (:  1955) is a 73 y.o. male,Established patient, here for evaluation of the following chief complaint(s):  Follow-up (stinging sensation behind left knee after walking up steps; had first immunotherapy last Thursday)         ASSESSMENT/PLAN:  1. AAA (abdominal aortic aneurysm) without rupture ([facility]  2. Aneurysm of left popliteal artery [facility])         This is a 29-year-old male patient presents the office to discuss his left popliteal artery aneurysm. His abdominal aortic aneurysm. Was uncomplicated. He is done reasonably well. He will need a 1 month interval CT angiogram to evaluate its patency and make sure there is no evidence of endoleak. The popliteal artery aneurysm repair we have gone back and forth over this. Now that he has had some plans in place for his axillary tumor which include mainly immune on chemotherapy in the near future he wants to have this popliteal artery as repair done. It is a 4.2 cm popliteal artery aneurysm. I do believe he is a reasonable candidate for stent graft. Of course the better option would of course be a bypass surgery. However the bypass graft good onto the below-knee popliteal artery in terms of access. The suprageniculate aneurysms can be challenging to sew to the above-knee popliteal artery because it basically encompasses that vessel. Therefore stent grafting is possible as well. It is a large diameter vessel he will need probably between a 9 and 10 mm diameter stent. Given that he has the EVAR in place it probably have to be done antegrade as well. However given the slender options available now we could possibly get away with only an 8 [place] sheath. We discussed the open and endovascular solutions. Given his psychological trauma that he is experiencing with his new diagnosis of cancer we ultimately decided that going ahead with an endovascular and minimally invasive approach first this is the better option.   He is already on aspirin and Plavix which she can stay on throughout this procedure. He understands that I bypass is a little more involved. Has a long recovery time and should he need to be started on chemotherapy sooner rather than later that would compromise his wound healing potential.  We also to make sure he is a good vein for you to use for conduit. Once again I discussed this clearly with him. He agreed to undergo the endovascular treatment. There is a risk for thrombosis of these grafts. There is a risk that they can intervally developed stenosis and therefore claudication can arise. However we were willing to accept symptoms risks right now given all of this going on with the patient. All questions answered. Plan for endovascular therapy next week. Subjective   SUBJECTIVE/OBJECTIVE:  This is a 78-year-old male patient presents the office today to discuss his left popliteal artery aneurysm. Status post abdominal aortic aneurysm repair. This was uncomplicated. He did reasonably well from that. No issues with the puncture sites. He still a couple weeks away from getting his follow-up scan. However he does have this axillary mass. He started on immunotherapy. He is also planning to possibly undergo chemotherapy. This is all before he undergoes mass excision. Therefore he and his oncologist decided that they want to have his popliteal artery aneurysm repaired as soon as possible in case certain issues arise with the cancer. He denies any fever chills. Denies any significant lower extremity claudication-like symptoms. He does endorse maybe occasional numbness behind his knee but states that maybe that is just a consequence of knowing the aneurysm is there now. Review of Systems   Constitutional: Negative for chills and fever. Respiratory: Negative for shortness of breath. Cardiovascular: Negative for leg swelling. Gastrointestinal: Negative for abdominal pain.    Musculoskeletal: Negative for

## 2022-03-30 ENCOUNTER — HOSPITAL ENCOUNTER (OUTPATIENT)
Dept: CARDIAC CATH/INVASIVE PROCEDURES | Age: 67
Discharge: HOME | End: 2022-03-30
Payer: COMMERCIAL

## 2022-03-30 VITALS
RESPIRATION RATE: 16 BRPM | OXYGEN SATURATION: 100 % | SYSTOLIC BLOOD PRESSURE: 138 MMHG | DIASTOLIC BLOOD PRESSURE: 83 MMHG | BODY MASS INDEX: 35.62 KG/M2 | TEMPERATURE: 97 F | WEIGHT: 263 LBS | HEART RATE: 78 BPM | HEIGHT: 72 IN

## 2022-03-30 LAB
ANION GAP SERPL CALCULATED.3IONS-SCNC: 15 MMOL/L (ref 3–16)
BUN BLDV-MCNC: 20 MG/DL (ref 7–20)
CALCIUM SERPL-MCNC: 9.3 MG/DL (ref 8.3–10.6)
CHLORIDE BLD-SCNC: 99 MMOL/L (ref 99–110)
CO2: 21 MMOL/L (ref 21–32)
CREAT SERPL-MCNC: 0.8 MG/DL (ref 0.8–1.3)
EKG ATRIAL RATE: 102 BPM
EKG DIAGNOSIS: NORMAL
EKG P AXIS: 33 DEGREES
EKG P-R INTERVAL: 206 MS
EKG Q-T INTERVAL: 332 MS
EKG QRS DURATION: 96 MS
EKG QTC CALCULATION (BAZETT): 432 MS
EKG R AXIS: -19 DEGREES
EKG T AXIS: 13 DEGREES
EKG VENTRICULAR RATE: 102 BPM
GFR AFRICAN AMERICAN: >60
GFR NON-AFRICAN AMERICAN: >60
GLUCOSE BLD-MCNC: 103 MG/DL (ref 70–99)
HCT VFR BLD CALC: 41.2 % (ref 40.5–52.5)
HEMOGLOBIN: 13.8 G/DL (ref 13.5–17.5)
MCH RBC QN AUTO: 31.5 PG (ref 26–34)
MCHC RBC AUTO-ENTMCNC: 33.6 G/DL (ref 31–36)
MCV RBC AUTO: 93.6 FL (ref 80–100)
PDW BLD-RTO: 13 % (ref 12.4–15.4)
PLATELET # BLD: 294 K/UL (ref 135–450)
PMV BLD AUTO: 6.9 FL (ref 5–10.5)
POTASSIUM SERPL-SCNC: 4.8 MMOL/L (ref 3.5–5.1)
RBC # BLD: 4.4 M/UL (ref 4.2–5.9)
SODIUM BLD-SCNC: 135 MMOL/L (ref 136–145)
WBC # BLD: 8.5 K/UL (ref 4–11)

## 2022-03-30 PROCEDURE — 6360000002 HC RX W HCPCS

## 2022-03-30 PROCEDURE — 93010 ELECTROCARDIOGRAM REPORT: CPT | Performed by: INTERNAL MEDICINE

## 2022-03-30 PROCEDURE — 2580000003 HC RX 258

## 2022-03-30 PROCEDURE — 99212 OFFICE O/P EST SF 10 MIN: CPT

## 2022-03-30 PROCEDURE — 85027 COMPLETE CBC AUTOMATED: CPT

## 2022-03-30 PROCEDURE — 80048 BASIC METABOLIC PNL TOTAL CA: CPT

## 2022-03-30 PROCEDURE — 93005 ELECTROCARDIOGRAM TRACING: CPT | Performed by: STUDENT IN AN ORGANIZED HEALTH CARE EDUCATION/TRAINING PROGRAM

## 2022-03-30 PROCEDURE — 2500000003 HC RX 250 WO HCPCS

## 2022-03-30 RX ORDER — SODIUM CHLORIDE 9 MG/ML
25 INJECTION, SOLUTION INTRAVENOUS PRN
Status: DISCONTINUED | OUTPATIENT
Start: 2022-03-30 | End: 2022-03-31 | Stop reason: HOSPADM

## 2022-03-30 RX ORDER — SODIUM CHLORIDE 0.9 % (FLUSH) 0.9 %
5-40 SYRINGE (ML) INJECTION EVERY 12 HOURS SCHEDULED
Status: DISCONTINUED | OUTPATIENT
Start: 2022-03-30 | End: 2022-03-31 | Stop reason: HOSPADM

## 2022-03-30 RX ORDER — SODIUM CHLORIDE 9 MG/ML
INJECTION, SOLUTION INTRAVENOUS CONTINUOUS
Status: DISCONTINUED | OUTPATIENT
Start: 2022-03-30 | End: 2022-03-31 | Stop reason: HOSPADM

## 2022-03-30 RX ORDER — SODIUM CHLORIDE 0.9 % (FLUSH) 0.9 %
5-40 SYRINGE (ML) INJECTION PRN
Status: DISCONTINUED | OUTPATIENT
Start: 2022-03-30 | End: 2022-03-31 | Stop reason: HOSPADM

## 2022-04-05 ENCOUNTER — OFFICE VISIT (OUTPATIENT)
Dept: CARDIOLOGY CLINIC | Age: 67
End: 2022-04-05
Payer: COMMERCIAL

## 2022-04-05 VITALS
SYSTOLIC BLOOD PRESSURE: 136 MMHG | HEART RATE: 68 BPM | DIASTOLIC BLOOD PRESSURE: 84 MMHG | OXYGEN SATURATION: 100 % | BODY MASS INDEX: 35.35 KG/M2 | WEIGHT: 261 LBS | HEIGHT: 72 IN

## 2022-04-05 DIAGNOSIS — I71.40 AAA (ABDOMINAL AORTIC ANEURYSM) WITHOUT RUPTURE: Primary | ICD-10-CM

## 2022-04-05 DIAGNOSIS — I72.4 ANEURYSM OF LEFT POPLITEAL ARTERY (HCC): ICD-10-CM

## 2022-04-05 DIAGNOSIS — Z01.818 PREOP TESTING: ICD-10-CM

## 2022-04-05 DIAGNOSIS — G47.30 SLEEP APNEA, UNSPECIFIED TYPE: ICD-10-CM

## 2022-04-05 DIAGNOSIS — R07.9 EXERTIONAL CHEST PAIN: ICD-10-CM

## 2022-04-05 DIAGNOSIS — I48.0 PAROXYSMAL ATRIAL FIBRILLATION (HCC): ICD-10-CM

## 2022-04-05 DIAGNOSIS — R22.30 AXILLARY MASS, UNSPECIFIED LATERALITY: ICD-10-CM

## 2022-04-05 DIAGNOSIS — I10 ESSENTIAL HYPERTENSION: ICD-10-CM

## 2022-04-05 DIAGNOSIS — I47.29 PAROXYSMAL VT: ICD-10-CM

## 2022-04-05 DIAGNOSIS — I25.10 CORONARY ARTERY DISEASE INVOLVING NATIVE CORONARY ARTERY OF NATIVE HEART WITHOUT ANGINA PECTORIS: ICD-10-CM

## 2022-04-05 DIAGNOSIS — E78.5 HYPERLIPIDEMIA LDL GOAL <70: ICD-10-CM

## 2022-04-05 LAB
ANION GAP SERPL CALCULATED.3IONS-SCNC: 12 MMOL/L (ref 3–16)
BASOPHILS ABSOLUTE: 0.1 K/UL (ref 0–0.2)
BASOPHILS RELATIVE PERCENT: 0.6 %
BUN BLDV-MCNC: 21 MG/DL (ref 7–20)
CALCIUM SERPL-MCNC: 9.9 MG/DL (ref 8.3–10.6)
CHLORIDE BLD-SCNC: 100 MMOL/L (ref 99–110)
CO2: 28 MMOL/L (ref 21–32)
CREAT SERPL-MCNC: 1 MG/DL (ref 0.8–1.3)
EOSINOPHILS ABSOLUTE: 0.4 K/UL (ref 0–0.6)
EOSINOPHILS RELATIVE PERCENT: 4.2 %
GFR AFRICAN AMERICAN: >60
GFR NON-AFRICAN AMERICAN: >60
GLUCOSE BLD-MCNC: 99 MG/DL (ref 70–99)
HCT VFR BLD CALC: 43.1 % (ref 40.5–52.5)
HEMOGLOBIN: 14.3 G/DL (ref 13.5–17.5)
LYMPHOCYTES ABSOLUTE: 1 K/UL (ref 1–5.1)
LYMPHOCYTES RELATIVE PERCENT: 11.2 %
MCH RBC QN AUTO: 31.2 PG (ref 26–34)
MCHC RBC AUTO-ENTMCNC: 33.1 G/DL (ref 31–36)
MCV RBC AUTO: 94.2 FL (ref 80–100)
MONOCYTES ABSOLUTE: 0.9 K/UL (ref 0–1.3)
MONOCYTES RELATIVE PERCENT: 10.2 %
NEUTROPHILS ABSOLUTE: 6.6 K/UL (ref 1.7–7.7)
NEUTROPHILS RELATIVE PERCENT: 73.8 %
PDW BLD-RTO: 13.3 % (ref 12.4–15.4)
PLATELET # BLD: 309 K/UL (ref 135–450)
PMV BLD AUTO: 7 FL (ref 5–10.5)
POTASSIUM SERPL-SCNC: 5.2 MMOL/L (ref 3.5–5.1)
RBC # BLD: 4.58 M/UL (ref 4.2–5.9)
SODIUM BLD-SCNC: 140 MMOL/L (ref 136–145)
WBC # BLD: 9 K/UL (ref 4–11)

## 2022-04-05 PROCEDURE — 99214 OFFICE O/P EST MOD 30 MIN: CPT | Performed by: INTERNAL MEDICINE

## 2022-04-05 NOTE — PROGRESS NOTES
Cardiology Follow    Lacho Ho  1955    PCP: Ludivina Frank MD  Referring Physician: Dr. Cari Lopez   Reason for Referral: uncontrolled HTN, AAA     Chief Complaint:   Chief Complaint   Patient presents with    Follow-up     saw Dr. James Guillory for CP       Subjective:     History of Present Illness: The patient is 73 y.o. male with a past medical history significant for NSVT/PVC, GERD, metastatic squamous cell carcinoma (unknown origin) s/p EGD/colonoscopy with Dr. Iqra Medina on 3/16/22 with no primary GI malignancy, HLD, HTN, former smoker, sleep apea, non-obstructive CAD, left popliteal artery aneurysm and abdominal aortic aneurysm s/p repair on 3/4/22 (Dr. James Guillory). He has axillary mass and started on immunotherapy with plan to undergo chemotherapy prior to mass excision. Planning to have popliteal artery aneurysm repaired as well. He returns in follow up for evaluation of chest pain. Patient says he was on the table to have left pop aneurysm fixed and began having chest pains with radiation down his left arm. He states that he has been having these symptoms with exertion and symptoms have progressed. He reports compliance with medical therapy and tolerating.      Past Medical History:   Diagnosis Date    Acid reflux 1996    Surgey to have stomach wrapped, unable to reguritat    Aortic aneurysm ([facility]     Arrhythmia 11/25/2012    runs of vtach    Hyperlipidemia     on fish oil    Hypertension     MI (myocardial infarction) ([facility]     x2     Past Surgical History:   Procedure Laterality Date    ABDOMINAL AORTIC ANEURYSM REPAIR, ENDOVASCULAR N/A 3/4/2022    ENDOVASCULAR REPAIR OF ABDOMINAL AORTIC ANEURYSM performed by Bridgett Fontanez DO at [facility] COLONOSCOPY N/A 3/16/2022    COLONOSCOPY WITH BIOPSY performed by Jer Clark MD at [address] [place] [address] ENDOSCOPY N/A 3/16/2022    EGD BIOPSY performed by Jocelyn Box MD at [facility] 23     Family History   Problem Relation Age of Onset    Other Father     Other Sister     Heart Disease Neg Hx      Social History     Tobacco Use    Smoking status: Former Smoker     Packs/day: 0.20     Years: 25.00     Pack years: 5.00     Types: Cigarettes     Quit date: 3/1/2010     Years since quittin.1    Smokeless tobacco: Never Used   Vaping Use    Vaping Use: Never used   Substance Use Topics    Alcohol use: No    Drug use: Not on file       Allergies   Allergen Reactions    Lipitor [Atorvastatin] Other (See Comments)     Current Outpatient Medications   Medication Sig Dispense Refill    omeprazole (PRILOSEC) 20 MG delayed release capsule Take 1 capsule by mouth every morning (before breakfast) 1/2 hour before breakfast 90 capsule 1    Metoprolol Succinate 50 MG CS24 Take by mouth      aspirin 81 MG EC tablet Take 81 mg by mouth daily      lisinopril-hydroCHLOROthiazide (PRINZIDE;ZESTORETIC) 10-12.5 MG per tablet Take 1 tablet by mouth 2 times daily 90 tablet 1    Misc Natural Products (GLUCOSAMINE CHOND MSM FORMULA PO) Take 750 mg by mouth 2 times daily      Cholecalciferol (VITAMIN D3) 50 MCG ( UT) CAPS Take by mouth daily      Nutritional Supplements (CHLORELLA-SPIRULINA COMPLEX PO) Take 6 tablets by mouth 2 times daily      Flax OIL Take 1,650 mg by mouth 2 softgel in am, 1 in pm      metoprolol tartrate (LOPRESSOR) 25 MG tablet 1 tab po as needed for palpitations 30 tablet 1    mexiletine (MEXITIL) 150 MG capsule TAKE 1 CAPSULE BY MOUTH TWICE A  capsule 1    clopidogrel (PLAVIX) 75 MG tablet TAKE 1 TABLET DAILY 30 tablet 11     No current facility-administered medications for this visit. Review of Systems:  · Constitutional: No unanticipated weight loss. There's been no change in energy level, sleep pattern, or activity level. No fevers, chills.    · Eyes: No visual changes or diplopia. No scleral icterus. · ENT: No Headaches, hearing loss or vertigo. No mouth sores or sore throat. · Cardiovascular: as reviewed in HPI  · Respiratory: No cough or wheezing, no sputum production. No hemoptysis. · Gastrointestinal: No abdominal pain, appetite loss, blood in stools. No change in bowel or bladder habits. · Genitourinary: No dysuria, trouble voiding, or hematuria. · Musculoskeletal:  No gait disturbance, no joint complaints. · Integumentary: No rash or pruritis. · Neurological: No headache, diplopia, change in muscle strength, numbness or tingling. · Psychiatric: No anxiety or depression. · Endocrine: No temperature intolerance. No excessive thirst, fluid intake, or urination. No tremor. · Hematologic/Lymphatic: No abnormal bruising or bleeding, blood clots or swollen lymph nodes. · Allergic/Immunologic: No nasal congestion or hives. Physical Exam:   /84   Pulse 68   Ht 6' (1.829 m)   Wt 261 lb (118.4 kg)   SpO2 100%   BMI 35.40 kg/m²   Wt Readings from Last 3 Encounters:   04/05/22 261 lb (118.4 kg)   03/30/22 263 lb (119.3 kg)   03/16/22 267 lb (121.1 kg)     Constitutional: He is oriented to person, place, and time. He appears well-developed and well-nourished. In no acute distress. Head: Normocephalic and atraumatic. Pupils equal and round. Neck: Neck supple. No JVP or carotid bruit appreciated. No mass and no thyromegaly present. No lymphadenopathy present. Cardiovascular: Normal rate. Normal heart sounds. Exam reveals no gallop and no friction rub. No murmur heard. Pulmonary/Chest: Effort normal and breath sounds normal. No respiratory distress. He has no wheezes, rhonchi or rales. Abdominal: Soft, non-tender. Bowel sounds are normal. He exhibits no organomegaly, mass or bruit. Extremities: No edema. No cyanosis or clubbing. Pulses are 2+ radial/carotid bilaterally. Neurological: No gross cranial nerve deficit.  Coordination normal.   Skin: Skin is warm and dry. There is no rash or diaphoresis. Psychiatric: He has a normal mood and affect. His speech is normal and behavior is normal.     Lab Review:    Lab Results   Component Value Date    TRIG 188 11/26/2012    HDL 32 02/11/2020    LDLCALC 108 02/11/2020    LABVLDL 34 02/11/2020     Lab Results   Component Value Date    BUN 20 03/30/2022    CREATININE 0.8 03/30/2022     Lab Results   Component Value Date/Time    TROPONINI <0.01 11/26/2012 06:45 AM    TROPONINI 0.00 11/25/2012 04:17 AM    LABA1C 5.6 02/11/2020 09:51 AM      No results found for: CBCAUTODIF    EKG Interpretation: reviewed EKG completed on 3/3/22    Echo: 11/2012  IMPRESSION- Normal contraction of a normal-sized left ventricle with   an estimated ejection fraction by visual inspection of 55%. No   specific abnormality of interventricular septal motion.  Mitral and   tricuspid regurgitation.  The right ventricular systolic pressure is   21 mmHg.  This is a technically suboptimal echo and subtle wall   motion changes might not be apparent on this study. Stress Test: 2015  Small sized anteroapical partial reversibility defect consistent with    ischemia and infarction in the territory of the distal LAD .    Small-moderate sized inferolateral partial reversibility defect consistent    with ischemia and infarction in the territory of the mid LCx and/or RCA .    Left ventricular ejection fraction of 57 %.         Cardiac MRI:1/2013     FINDINGS: No cardiac chamber enlargement is identified. No pericardial effusion is identified. No axillary, mediastinal or hilar lymphadenopathy is identified. No pleural   effusions are    identified. The thoracic aorta is without evidence of any aneurysm. No valvular stenosis or    regurgitation is identified.           There is no evidence of any delayed myocardial enhancement. No abnormal wall motion or    contractility is identified.  The left ventricular ejection fraction is calculated at 41% however visually it is greater than 55%. The abnormal calculated ejection fraction is    attributed to the difficulty with cardiac gating of the pulse sequences. 1. Normal cardiac MRI with normal cardiac chamber size, contractility, and wall motion. 2. No evidence of any myocardial scarring. Cath: Angiogram 1/23/15: LVEDP - 15. LVgram - normal with EF 55%. 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 The bone windows show no acute or focal abnormality.       The liver demonstrates diffusely decreased attenuation suggestive   of fatty infiltration. No focal hepatic lesions are seen. The   gallbladder, adrenal glands, spleen, and pancreas are   unremarkable.       Multiple exophytic bilateral renal cortical lesions are identified   most compatible with cysts. The small and large bowel show normal   caliber and wall thickness. The appendix appears normal. There is   scattered colonic diverticula noted with no CT evidence of active   inflammation. There is  dilation of the infrarenal abdominal aorta   measuring up to 3.2 x 3.0 cm in dimension.       No free fluid or adenopathy is seen. CTA abd pel w con: 3/2015     Coronary arterial calcifications are noted. The visualized lung   bases are clear. Bone windows show no acute or focal abnormality.       The liver, spleen, pancreas, and kidneys enhance homogeneously   with the exception of multiple bilateral renal cortical cysts. The   adrenal glands and gallbladder show no abnormality.  Evaluation of   the bowel is limited due to a lack of administration of oral   contrast. No dilated loops of bowel are identified. The appendix   appears normal. There are a few scattered colonic diverticula   which show no evidence of active inflammation.       There is an infrarenal abdominal aortic aneurysm which measures up   to 3.4 x 3.5 cm, slightly increased in size since the prior study   at which time it measured 3.2 x 3.1 cm at the same level. The   common iliac arteries are nondilated.       No free fluid or adenopathy is seen. CTA chest abd aorta with runoff: 2/28/22  1. 9 cm long fusiform infrarenal abdominal aortic aneurysm, maximum   transverse diameter of 6.9 cm.  Moderate intramural thrombus in the aneurysm,   with patent lumen of 5.6 x 3.4 cm.   2. 9 cm in long fusiform distal SFA-popliteal artery aneurysm maximum   diameter of 4.2 cm.  Extensive circumferential intramural thrombus in the   aneurysm, with patent lumen of 1 cm distally. .   3. Bilateral calf vessels difficult to assess due to limited contrast   opacification.  No delayed images available. 4. Bilateral renal simple cysts. 5. Colonic diverticulosis. All above diagnostic testing and laboratory data was independently visualized and reviewed by me (not simply review of report)       Assessment and Plan     1) Infrarenal abdominal aortic aneurysm / left popliteal artery aneurysm   -1st imaged 2012  -CTA chest/abd/aorta w runoff completed 2/28/22  -endovascular repair of abdominal aortic aneurysm with Dr. Jane Phillips on 3/4/22  -plans to repair left popliteal artery aneurysm   -continue medical threapy    2) non-obstructive CAD-Dr. Elkin Amaya  -progressive exertional chest pain  -I recommend the patient pursue a left heart catheterization with possible percutaneous coronary intervention. The risks, benefits and alternatives of the procedure were discussed with the patient.  The risks include but are not limited to: vascular injury, bleeding, infection, injury to surrounding structures, stroke, myocardial infarction and death. The patient is amenable to undergoing the procedure. We will have this scheduled. 3) HLD, unspecified  -reviewed lipid panel completed on 2/2/22 ([facility])  -  -patient reports intolerance to statins     4) HTN, essential   -BP stable. -BMP abnormal 2/2022  -Continue Toprol-XL 50 mg BID and prinzide 10-12.5 mg BID     5) Former smoker  -encouraged smoking cessation. 6) sleep apnea  -sleeps propped up due to back pain  -reports not using sleep machine     7) NSVT/PVC//AFIB/palpitati  ons   -managed per Dr. Narendra Flanagan     8) GERD  -managed per GI  -s/p EGD colonoscopy 3/16/22    9) Axillary mass  -squamous cell carcinoma.   -on immunotherapy to be followed by chemotherapy    We will plan follow up     Thank you very much for allowing me to participate in the care of your patient. Please do not hesitate to contact me if you have any questions. Lola Acosta MD [address] [place] [address], Interventional Cardiology, and Peripheral Vascular Disease   [facility]   Ph: [phone]  Fax: [phone]    This note was scribed in the presence of Dr. Meri Liu MD by Milagros Rome RN    Physician Attestation:  The scribes documentation has been prepared under my direction and personally reviewed by me in its entirety. I confirm the note above accurately reflects all work, treatment, procedures, and medical decision making performed by me.     Electronically signed by Alicia Castro MD on 4/26/2022 at 10:41 PM

## 2022-04-05 NOTE — PATIENT INSTRUCTIONS
Patient Education        Chest Pain: Care Instructions  Your Care Instructions     There are many things that can cause chest pain. Some are not serious and will get better on their own in a few days. But some kinds of chest pain need more testing and treatment. Your doctor may have recommended a follow-up visit in the next 8 to 12 hours. If you are not getting better, you may need more testsor treatment. Even though your doctor has released you, you still need to watch for any problems. The doctor carefully checked you, but sometimes problems can develop later. If you have new symptoms or if your symptoms do not get better, getmedical care right away. If you have worse or different chest pain or pressure that lasts more than 5 minutes or you passed out (lost consciousness), call 911 or seek other emergency help right away. A medical visit is only one step in your treatment. Even if you feel better, you still need to do what your doctor recommends, such as going to all suggested follow-up appointments and taking medicines exactly as directed. Thiswill help you recover and help prevent future problems. How can you care for yourself at home?  Rest until you feel better.  Take your medicine exactly as prescribed. Call your doctor if you think you are having a problem with your medicine.  Do not drive after taking a prescription pain medicine. When should you call for help? Call 911 if:     You passed out (lost consciousness).      You have severe difficulty breathing.      You have symptoms of a heart attack. These may include:  ? Chest pain or pressure, or a strange feeling in your chest.  ? Sweating. ? Shortness of breath. ? Nausea or vomiting. ? Pain, pressure, or a strange feeling in your back, neck, jaw, or upper belly or in one or both shoulders or arms. ? Lightheadedness or sudden weakness. ? A fast or irregular heartbeat.   After you call 911, the  may tell you to chew 1 adult-strength or 2 to 4 low-dose aspirin. Wait for an ambulance. Do not try to drive yourself. Call your doctor today if:      You have any trouble breathing.      Your chest pain gets worse.      You are dizzy or lightheaded, or you feel like you may faint.      You are not getting better as expected.      You are having new or different chest pain. Where can you learn more? Go to https://[employer]peInotrem.[employer]. org and sign in to your Goodreads account. Enter A120 in the [employer] box to learn more about \"Chest Pain: Care Instructions. \"     If you do not have an account, please click on the \"Sign Up Now\" link. Current as of: July 1, 2021               Content Version: 13.2  © 3901-0344 [employer]. Care instructions adapted under license by [facility]"[employer] [facility] ([facility]). If you have questions about a medical condition or this instruction, always ask your healthcare professional. [place] [address] any warranty or liability for your use of this information. Scheduled for left heart catheterization with Dr. Abelardo Mckeon     Scheduled on 4/13/22 at 9 am  Arrival time : 7:30 am       The morning of your procedure you will park in the hospital parking lot and report directly to the cath lab to check in.     Pre-Procedure Instructions   1. You will need to fast for at least 8 hours prior to procedure. No caffeine, gum or mints the morning of procedure. 2. Hold all diabetic medications including, Metfomin. If you take Lantus/Levemir only take ½ your normal dose the evening before. All other medications can be taken in the morning with sips of water. 3. You will need to take 325 mg aspirin the morning of. If you are currently taking 81 mg please take 4 tablets that morning. 4. Do not use any lotions, creams or perfume the morning of procedure. 5. Pre-procedure lab work will need to be completed 5-7 days prior to procedure.    6. Please have a responsible adult to drive you home after procedure. We advise you have someone stay with you for the first 24 hours for precautionary measures. Depending on your procedure you may require an overnight stay. 7. Cath lab will provide you with all post procedure instructions. If you have any questions regarding the procedure itself or medications, please call [phone] and ask to speak with a nurse.

## 2022-04-07 ENCOUNTER — TELEPHONE (OUTPATIENT)
Dept: CARDIOLOGY CLINIC | Age: 67
End: 2022-04-07

## 2022-04-07 DIAGNOSIS — I25.10 CORONARY ARTERY DISEASE INVOLVING NATIVE CORONARY ARTERY OF NATIVE HEART WITHOUT ANGINA PECTORIS: ICD-10-CM

## 2022-04-07 DIAGNOSIS — R07.9 EXERTIONAL CHEST PAIN: Primary | ICD-10-CM

## 2022-04-07 NOTE — TELEPHONE ENCOUNTER
Mercedes called from [facility] on behalf of [facility] for Mr. Nidia Schwartz scheduled 22  has been denied due to no stress testing. A peer to peer can be completed by calling [phone]. They said no reference number is needed just refer to patient name and .

## 2022-04-07 NOTE — TELEPHONE ENCOUNTER
Called [employer] to see if additional information could be submitted for approval. Unable to submit additional information, Dr. Millie Orellana will need to speak with physician reviewer if he would like to proceed with the [address] [place] [address] or he can order a stress test to be completed prior.

## 2022-04-07 NOTE — TELEPHONE ENCOUNTER
Dr. Robert Bryan,     Patient was seen in office 4/5/22, reported exertional chest pains and scheduled for a [facility] 4/13/22. Patient had prior cath in 2015 with nonobstructive disease. Insurance is requiring a stress test to be completed prior or can call to complete a physician to physician peer to peer review.

## 2022-04-08 NOTE — TELEPHONE ENCOUNTER
Discussed with Dr. Chica Cano, proceed with stress test per insurance recommendations. Orders placed. Dale Rivera and reviewed recommendations. He is unable to walk on a treadmill, lexiscan ordered. He v/u and instructed our office will call to facilitate schedule and if chest pains worsen he will need to proceed to the ED for further evaluation.      Please call to schedule Lexiscan stress test.

## 2022-04-08 NOTE — TELEPHONE ENCOUNTER
I have Maria L Stuart scheduled for Thursday 4/21/22 at [facility] arriving at 9:45 am.     I called and went over the date/time and instructions with Maria L Stuart, he v/u

## 2022-04-21 ENCOUNTER — HOSPITAL ENCOUNTER (OUTPATIENT)
Dept: NON INVASIVE DIAGNOSTICS | Age: 67
Discharge: HOME | End: 2022-04-21
Payer: COMMERCIAL

## 2022-04-21 DIAGNOSIS — R07.9 EXERTIONAL CHEST PAIN: ICD-10-CM

## 2022-04-21 DIAGNOSIS — I25.10 CORONARY ARTERY DISEASE INVOLVING NATIVE CORONARY ARTERY OF NATIVE HEART WITHOUT ANGINA PECTORIS: ICD-10-CM

## 2022-04-21 LAB
LV EF: 60 %
LVEF MODALITY: NORMAL

## 2022-04-21 PROCEDURE — 3430000000 HC RX DIAGNOSTIC RADIOPHARMACEUTICAL: Performed by: INTERNAL MEDICINE

## 2022-04-21 PROCEDURE — 93017 CV STRESS TEST TRACING ONLY: CPT

## 2022-04-21 PROCEDURE — 6360000002 HC RX W HCPCS: Performed by: INTERNAL MEDICINE

## 2022-04-21 PROCEDURE — A9502 TC99M TETROFOSMIN: HCPCS | Performed by: INTERNAL MEDICINE

## 2022-04-21 PROCEDURE — 78452 HT MUSCLE IMAGE SPECT MULT: CPT

## 2022-04-21 RX ADMIN — TETROFOSMIN 10 MILLICURIE: 1.38 INJECTION, POWDER, LYOPHILIZED, FOR SOLUTION INTRAVENOUS at 09:58

## 2022-04-21 RX ADMIN — TETROFOSMIN 30 MILLICURIE: 1.38 INJECTION, POWDER, LYOPHILIZED, FOR SOLUTION INTRAVENOUS at 11:15

## 2022-04-21 RX ADMIN — REGADENOSON 0.4 MG: 0.08 INJECTION, SOLUTION INTRAVENOUS at 11:14

## 2022-04-27 DIAGNOSIS — R07.9 EXERTIONAL CHEST PAIN: Primary | ICD-10-CM

## 2022-05-26 ENCOUNTER — HOSPITAL ENCOUNTER (OUTPATIENT)
Dept: CT IMAGING | Age: 67
Discharge: HOME | End: 2022-05-26
Payer: COMMERCIAL

## 2022-05-26 VITALS
RESPIRATION RATE: 18 BRPM | DIASTOLIC BLOOD PRESSURE: 90 MMHG | OXYGEN SATURATION: 99 % | TEMPERATURE: 97.9 F | WEIGHT: 261 LBS | HEART RATE: 53 BPM | SYSTOLIC BLOOD PRESSURE: 131 MMHG | HEIGHT: 72 IN | BODY MASS INDEX: 35.35 KG/M2

## 2022-05-26 DIAGNOSIS — R07.9 EXERTIONAL CHEST PAIN: ICD-10-CM

## 2022-05-26 LAB
GFR AFRICAN AMERICAN: >60
GFR NON-AFRICAN AMERICAN: >60
PERFORMED ON: NORMAL
POC CREATININE: 1 MG/DL (ref 0.8–1.3)
POC SAMPLE TYPE: NORMAL

## 2022-05-26 PROCEDURE — 6360000004 HC RX CONTRAST MEDICATION: Performed by: INTERNAL MEDICINE

## 2022-05-26 PROCEDURE — 82565 ASSAY OF CREATININE: CPT

## 2022-05-26 PROCEDURE — 6370000000 HC RX 637 (ALT 250 FOR IP): Performed by: INTERNAL MEDICINE

## 2022-05-26 PROCEDURE — 75574 CT ANGIO HRT W/3D IMAGE: CPT

## 2022-05-26 RX ORDER — NITROGLYCERIN 0.4 MG/1
0.4 TABLET SUBLINGUAL ONCE
Status: COMPLETED | OUTPATIENT
Start: 2022-05-26 | End: 2022-05-26

## 2022-05-26 RX ADMIN — NITROGLYCERIN 0.4 MG: 0.4 TABLET, ORALLY DISINTEGRATING SUBLINGUAL at 09:13

## 2022-05-26 RX ADMIN — IOPAMIDOL 125 ML: 755 INJECTION, SOLUTION INTRAVENOUS at 09:27

## 2022-05-26 NOTE — PROGRESS NOTES
Pt here for coronary CTA. Resting HR in pre was 53. Verbalized understanding of scan and agreeable to use of Nitro. 8819- pt on table- HR 58  0913- arm had to be repositioned for better bolus.  Nitro given  [id number]- scan complete- pt back to cath lab    Electronically signed by Cristian Mora RN on 5/26/2022 at 9:15 AM

## 2022-05-27 ENCOUNTER — TELEPHONE (OUTPATIENT)
Dept: CARDIOLOGY CLINIC | Age: 67
End: 2022-05-27

## 2022-05-27 DIAGNOSIS — I25.10 CORONARY ARTERY DISEASE INVOLVING NATIVE CORONARY ARTERY OF NATIVE HEART WITHOUT ANGINA PECTORIS: Primary | ICD-10-CM

## 2022-05-27 DIAGNOSIS — R93.1 ELEVATED CORONARY ARTERY CALCIUM SCORE: ICD-10-CM

## 2022-05-27 NOTE — TELEPHONE ENCOUNTER
Per CTA results: CT shows multiple blockages. Recommend cardiac cath. Spoke with patient and he is agreeable. Please call with date/time. The morning of your procedure you will park in the hospital parking lot and report directly to the cath lab to check in.      Pre-Procedure Instructions   1. You will need to fast for at least 8 hours prior to procedure. No caffeine, gum or mints the morning of procedure. 2. Hold all diabetic medications including, Metfomin. If you take Lantus/Levemir only take ½ your normal dose the evening before. All other medications can be taken in the morning with sips of water. 3. You will need to take 325 mg aspirin the morning of. If you are currently taking 81 mg please take 4 tablets that morning. 4. Do not use any lotions, creams or perfume the morning of procedure. 5. Pre-procedure lab work will need to be completed 5-7 days prior to procedure. 6. Please have a responsible adult to drive you home after procedure. We advise you have someone stay with you for the first 24 hours for precautionary measures. Depending on your procedure you may require an overnight stay. 7. Cath lab will provide you with all post procedure instructions.      If you have any questions regarding the procedure itself or medications, please call [phone] and ask to speak with a nurse.

## 2022-05-31 NOTE — TELEPHONE ENCOUNTER
Procedure is scheduled for 6/16/22 at 7:30 am. Arrival time is 6:30 am.     Pt is agreeable to date/time. Went over pre-procedure instructions. Pt v/u. Published on [employer] and e-mail to [facility].

## 2022-06-01 NOTE — TELEPHONE ENCOUNTER
Procedure needs to be moved to 6/17/22 at 8:30 am. Arrival time is 7 am. Pt is agreeable Jordyn Owens is updated and new form was sent.

## 2022-06-03 ENCOUNTER — TELEPHONE (OUTPATIENT)
Dept: VASCULAR SURGERY | Age: 67
End: 2022-06-03

## 2022-06-07 ENCOUNTER — TELEPHONE (OUTPATIENT)
Dept: CARDIOLOGY CLINIC | Age: 67
End: 2022-06-07

## 2022-06-13 DIAGNOSIS — R93.1 ELEVATED CORONARY ARTERY CALCIUM SCORE: ICD-10-CM

## 2022-06-13 DIAGNOSIS — I25.10 CORONARY ARTERY DISEASE INVOLVING NATIVE CORONARY ARTERY OF NATIVE HEART WITHOUT ANGINA PECTORIS: ICD-10-CM

## 2022-06-13 LAB
HCT VFR BLD CALC: 40.3 % (ref 40.5–52.5)
HEMOGLOBIN: 13.5 G/DL (ref 13.5–17.5)
MCH RBC QN AUTO: 32.2 PG (ref 26–34)
MCHC RBC AUTO-ENTMCNC: 33.6 G/DL (ref 31–36)
MCV RBC AUTO: 95.8 FL (ref 80–100)
PDW BLD-RTO: 15.8 % (ref 12.4–15.4)
PLATELET # BLD: 214 K/UL (ref 135–450)
PMV BLD AUTO: 7 FL (ref 5–10.5)
RBC # BLD: 4.2 M/UL (ref 4.2–5.9)
WBC # BLD: 5.2 K/UL (ref 4–11)

## 2022-06-14 LAB
ANION GAP SERPL CALCULATED.3IONS-SCNC: 12 MMOL/L (ref 3–16)
BUN BLDV-MCNC: 20 MG/DL (ref 7–20)
CALCIUM SERPL-MCNC: 8.8 MG/DL (ref 8.3–10.6)
CHLORIDE BLD-SCNC: 100 MMOL/L (ref 99–110)
CO2: 24 MMOL/L (ref 21–32)
CREAT SERPL-MCNC: 0.8 MG/DL (ref 0.8–1.3)
GFR AFRICAN AMERICAN: >60
GFR NON-AFRICAN AMERICAN: >60
GLUCOSE BLD-MCNC: 98 MG/DL (ref 70–99)
POTASSIUM SERPL-SCNC: 4.6 MMOL/L (ref 3.5–5.1)
SODIUM BLD-SCNC: 136 MMOL/L (ref 136–145)

## 2022-06-16 NOTE — TELEPHONE ENCOUNTER
Procedure needs to be pushed to 9:30 am. Arrival time is 8:00 am. Pt is agreeable to time change. Castillo Mcfarland was updated and the cath lab was notified.

## 2022-06-17 ENCOUNTER — HOSPITAL ENCOUNTER (OUTPATIENT)
Dept: CARDIAC CATH/INVASIVE PROCEDURES | Age: 67
Discharge: HOME | End: 2022-06-17
Payer: COMMERCIAL

## 2022-06-17 VITALS
WEIGHT: 275 LBS | OXYGEN SATURATION: 96 % | SYSTOLIC BLOOD PRESSURE: 135 MMHG | TEMPERATURE: 97.9 F | HEART RATE: 61 BPM | HEIGHT: 72 IN | BODY MASS INDEX: 37.25 KG/M2 | DIASTOLIC BLOOD PRESSURE: 77 MMHG | RESPIRATION RATE: 20 BRPM

## 2022-06-17 DIAGNOSIS — I25.10 CORONARY ARTERY DISEASE INVOLVING NATIVE CORONARY ARTERY OF NATIVE HEART WITHOUT ANGINA PECTORIS: Chronic | ICD-10-CM

## 2022-06-17 DIAGNOSIS — R07.89 OTHER CHEST PAIN: Primary | ICD-10-CM

## 2022-06-17 LAB
ANION GAP SERPL CALCULATED.3IONS-SCNC: 12 MMOL/L (ref 3–16)
BUN BLDV-MCNC: 20 MG/DL (ref 7–20)
CALCIUM SERPL-MCNC: 8.7 MG/DL (ref 8.3–10.6)
CHLORIDE BLD-SCNC: 98 MMOL/L (ref 99–110)
CO2: 20 MMOL/L (ref 21–32)
CREAT SERPL-MCNC: 0.9 MG/DL (ref 0.8–1.3)
EKG ATRIAL RATE: 55 BPM
EKG ATRIAL RATE: 59 BPM
EKG DIAGNOSIS: NORMAL
EKG DIAGNOSIS: NORMAL
EKG P AXIS: 10 DEGREES
EKG P AXIS: 49 DEGREES
EKG P-R INTERVAL: 228 MS
EKG P-R INTERVAL: 240 MS
EKG Q-T INTERVAL: 400 MS
EKG Q-T INTERVAL: 426 MS
EKG QRS DURATION: 88 MS
EKG QRS DURATION: 88 MS
EKG QTC CALCULATION (BAZETT): 396 MS
EKG QTC CALCULATION (BAZETT): 407 MS
EKG R AXIS: -14 DEGREES
EKG R AXIS: -4 DEGREES
EKG T AXIS: 1 DEGREES
EKG T AXIS: 6 DEGREES
EKG VENTRICULAR RATE: 55 BPM
EKG VENTRICULAR RATE: 59 BPM
GFR AFRICAN AMERICAN: >60
GFR NON-AFRICAN AMERICAN: >60
GLUCOSE BLD-MCNC: 143 MG/DL (ref 70–99)
POTASSIUM SERPL-SCNC: 4.7 MMOL/L (ref 3.5–5.1)
REASON FOR REJECTION: NORMAL
REJECTED TEST: NORMAL
SODIUM BLD-SCNC: 130 MMOL/L (ref 136–145)

## 2022-06-17 PROCEDURE — 99152 MOD SED SAME PHYS/QHP 5/>YRS: CPT

## 2022-06-17 PROCEDURE — 93010 ELECTROCARDIOGRAM REPORT: CPT | Performed by: INTERNAL MEDICINE

## 2022-06-17 PROCEDURE — 92978 ENDOLUMINL IVUS OCT C 1ST: CPT

## 2022-06-17 PROCEDURE — 2580000003 HC RX 258

## 2022-06-17 PROCEDURE — 36415 COLL VENOUS BLD VENIPUNCTURE: CPT

## 2022-06-17 PROCEDURE — 93005 ELECTROCARDIOGRAM TRACING: CPT | Performed by: INTERNAL MEDICINE

## 2022-06-17 PROCEDURE — C1761 HC CATH TRANSLUM INTRAVASCULAR LITHOTRIPSY CORONARY: HCPCS

## 2022-06-17 PROCEDURE — 2709999900 HC NON-CHARGEABLE SUPPLY

## 2022-06-17 PROCEDURE — 2500000003 HC RX 250 WO HCPCS

## 2022-06-17 PROCEDURE — C1769 GUIDE WIRE: HCPCS

## 2022-06-17 PROCEDURE — 93454 CORONARY ARTERY ANGIO S&I: CPT

## 2022-06-17 PROCEDURE — C1887 CATHETER, GUIDING: HCPCS

## 2022-06-17 PROCEDURE — 6360000002 HC RX W HCPCS

## 2022-06-17 PROCEDURE — 6370000000 HC RX 637 (ALT 250 FOR IP)

## 2022-06-17 PROCEDURE — 85347 COAGULATION TIME ACTIVATED: CPT

## 2022-06-17 PROCEDURE — 93571 IV DOP VEL&/PRESS C FLO 1ST: CPT

## 2022-06-17 PROCEDURE — C1874 STENT, COATED/COV W/DEL SYS: HCPCS

## 2022-06-17 PROCEDURE — 80048 BASIC METABOLIC PNL TOTAL CA: CPT

## 2022-06-17 PROCEDURE — C1894 INTRO/SHEATH, NON-LASER: HCPCS

## 2022-06-17 PROCEDURE — C9600 PERC DRUG-EL COR STENT SING: HCPCS

## 2022-06-17 PROCEDURE — C1725 CATH, TRANSLUMIN NON-LASER: HCPCS

## 2022-06-17 PROCEDURE — 99153 MOD SED SAME PHYS/QHP EA: CPT

## 2022-06-17 PROCEDURE — 6360000004 HC RX CONTRAST MEDICATION: Performed by: INTERNAL MEDICINE

## 2022-06-17 PROCEDURE — C1753 CATH, INTRAVAS ULTRASOUND: HCPCS

## 2022-06-17 RX ORDER — ASPIRIN 325 MG
325 TABLET ORAL ONCE
Status: DISCONTINUED | OUTPATIENT
Start: 2022-06-17 | End: 2022-06-18 | Stop reason: HOSPADM

## 2022-06-17 RX ORDER — SODIUM CHLORIDE 9 MG/ML
INJECTION, SOLUTION INTRAVENOUS PRN
Status: DISCONTINUED | OUTPATIENT
Start: 2022-06-17 | End: 2022-06-18 | Stop reason: HOSPADM

## 2022-06-17 RX ORDER — ACETAMINOPHEN 325 MG/1
650 TABLET ORAL EVERY 4 HOURS PRN
Status: DISCONTINUED | OUTPATIENT
Start: 2022-06-17 | End: 2022-06-18 | Stop reason: HOSPADM

## 2022-06-17 RX ORDER — SODIUM CHLORIDE 0.9 % (FLUSH) 0.9 %
5-40 SYRINGE (ML) INJECTION EVERY 12 HOURS SCHEDULED
Status: DISCONTINUED | OUTPATIENT
Start: 2022-06-17 | End: 2022-06-18 | Stop reason: HOSPADM

## 2022-06-17 RX ORDER — SODIUM CHLORIDE 0.9 % (FLUSH) 0.9 %
5-40 SYRINGE (ML) INJECTION PRN
Status: DISCONTINUED | OUTPATIENT
Start: 2022-06-17 | End: 2022-06-18 | Stop reason: HOSPADM

## 2022-06-17 RX ADMIN — IOPAMIDOL 220 ML: 755 INJECTION, SOLUTION INTRAVENOUS at 10:23

## 2022-06-17 NOTE — H&P
Cardiology      Clint Maureen  1955     PCP: James Guerrero MD  Referring Physician: Dr. Robin Yi   Reason for Referral: uncontrolled HTN, AAA      Chief Complaint:        Chief Complaint   Patient presents with    Follow-up       saw Dr. Venita Hinojosa for CP         Subjective:      History of Present Illness: The patient is 73 y.o. male with a past medical history significant for NSVT/PVC, GERD, metastatic squamous cell carcinoma (unknown origin) s/p EGD/colonoscopy with Dr. Adrian Guerrero on 3/16/22 with no primary GI malignancy, HLD, HTN, former smoker, sleep apea, non-obstructive CAD, left popliteal artery aneurysm and abdominal aortic aneurysm s/p repair on 3/4/22 (Dr. Venita Hinojosa). He has axillary mass and started on immunotherapy with plan to undergo chemotherapy prior to mass excision. Planning to have popliteal artery aneurysm repaired as well. He returns in follow up for evaluation of chest pain. Patient says he was on the table to have left pop aneurysm fixed and began having chest pains with radiation down his left arm. He states that he has been having these symptoms with exertion and symptoms have progressed.  He reports compliance with medical therapy and tolerating.      Past Medical History        Past Medical History:   Diagnosis Date    Acid reflux 1996     Surgey to have stomach wrapped, unable to reguritat    Aortic aneurysm ([address]      Arrhythmia 11/25/2012     runs of vtach    Hyperlipidemia       on fish oil    Hypertension      MI (myocardial infarction) [facility]       x2         Past Surgical History         Past Surgical History:   Procedure Laterality Date    ABDOMINAL AORTIC ANEURYSM REPAIR, ENDOVASCULAR N/A 3/4/2022     ENDOVASCULAR REPAIR OF ABDOMINAL AORTIC ANEURYSM performed by April Lockwood DO at [address] COLONOSCOPY N/A 3/16/2022     COLONOSCOPY WITH BIOPSY performed by Jael Paredes MD at [address] MONITOR        TONSILLECTOMY        UMBILICAL HERNIA REPAIR        UPPER GASTROINTESTINAL ENDOSCOPY N/A 3/16/2022     EGD BIOPSY performed by Pilar German MD at [facility]         Family History         Family History   Problem Relation Age of Onset    Other Father      Other Sister      Heart Disease Neg Hx           Social History            Tobacco Use    Smoking status: Former Smoker       Packs/day: 0.20       Years: 25.00       Pack years: 5.00       Types: Cigarettes       Quit date: 3/1/2010       Years since quittin.1    Smokeless tobacco: Never Used   Vaping Use    Vaping Use: Never used   Substance Use Topics    Alcohol use: No    Drug use: Not on file              Allergies   Allergen Reactions    Lipitor [Atorvastatin] Other (See Comments)      Current Facility-Administered Medications          Current Outpatient Medications   Medication Sig Dispense Refill    omeprazole (PRILOSEC) 20 MG delayed release capsule Take 1 capsule by mouth every morning (before breakfast) 1/2 hour before breakfast 90 capsule 1    Metoprolol Succinate 50 MG CS24 Take by mouth        aspirin 81 MG EC tablet Take 81 mg by mouth daily        lisinopril-hydroCHLOROthiazide (PRINZIDE;ZESTORETIC) 10-12.5 MG per tablet Take 1 tablet by mouth 2 times daily 90 tablet 1    Misc Natural Products (GLUCOSAMINE CHOND MSM FORMULA PO) Take 750 mg by mouth 2 times daily        Cholecalciferol (VITAMIN D3) 50 MCG ( UT) CAPS Take by mouth daily        Nutritional Supplements (CHLORELLA-SPIRULINA COMPLEX PO) Take 6 tablets by mouth 2 times daily        Flax OIL Take 1,650 mg by mouth 2 softgel in am, 1 in pm        metoprolol tartrate (LOPRESSOR) 25 MG tablet 1 tab po as needed for palpitations 30 tablet 1    mexiletine (MEXITIL) 150 MG capsule TAKE 1 CAPSULE BY MOUTH TWICE A  capsule 1    clopidogrel (PLAVIX) 75 MG tablet TAKE 1 TABLET DAILY 30 tablet 11      No current facility-administered medications for this visit.            Review of Systems:  · Constitutional: No unanticipated weight loss. There's been no change in energy level, sleep pattern, or activity level. No fevers, chills. · Eyes: No visual changes or diplopia. No scleral icterus. · ENT: No Headaches, hearing loss or vertigo. No mouth sores or sore throat. · Cardiovascular: as reviewed in HPI  · Respiratory: No cough or wheezing, no sputum production. No hemoptysis. · Gastrointestinal: No abdominal pain, appetite loss, blood in stools. No change in bowel or bladder habits. · Genitourinary: No dysuria, trouble voiding, or hematuria. · Musculoskeletal:  No gait disturbance, no joint complaints. · Integumentary: No rash or pruritis. · Neurological: No headache, diplopia, change in muscle strength, numbness or tingling. · Psychiatric: No anxiety or depression. · Endocrine: No temperature intolerance. No excessive thirst, fluid intake, or urination. No tremor. · Hematologic/Lymphatic: No abnormal bruising or bleeding, blood clots or swollen lymph nodes. · Allergic/Immunologic: No nasal congestion or hives.     Physical Exam:   /84   Pulse 68   Ht 6' (1.829 m)   Wt 261 lb (118.4 kg)   SpO2 100%   BMI 35.40 kg/m²       Wt Readings from Last 3 Encounters:   04/05/22 261 lb (118.4 kg)   03/30/22 263 lb (119.3 kg)   03/16/22 267 lb (121.1 kg)      Constitutional: He is oriented to person, place, and time. He appears well-developed and well-nourished. In no acute distress. Head: Normocephalic and atraumatic. Pupils equal and round. Neck: Neck supple. No JVP or carotid bruit appreciated. No mass and no thyromegaly present. No lymphadenopathy present. Cardiovascular: Normal rate. Normal heart sounds. Exam reveals no gallop and no friction rub. No murmur heard. Pulmonary/Chest: Effort normal and breath sounds normal. No respiratory distress. He has no wheezes, rhonchi or rales.    Abdominal: Soft, non-tender. Bowel sounds are normal. He exhibits no organomegaly, mass or bruit. Extremities: No edema. No cyanosis or clubbing. Pulses are 2+ radial/carotid bilaterally. Neurological: No gross cranial nerve deficit. Coordination normal.   Skin: Skin is warm and dry. There is no rash or diaphoresis. Psychiatric: He has a normal mood and affect. His speech is normal and behavior is normal.      Lab Review:          Lab Results   Component Value Date     TRIG 188 11/26/2012     HDL 32 02/11/2020     LDLCALC 108 02/11/2020     LABVLDL 34 02/11/2020            Lab Results   Component Value Date     BUN 20 03/30/2022     CREATININE 0.8 03/30/2022            Lab Results   Component Value Date/Time     TROPONINI <0.01 11/26/2012 06:45 AM     TROPONINI 0.00 11/25/2012 04:17 AM     LABA1C 5.6 02/11/2020 09:51 AM      No results found for: CBCAUTODIF     EKG Interpretation: reviewed EKG completed on 3/3/22     Echo: 11/2012  IMPRESSION- Normal contraction of a normal-sized left ventricle with   an estimated ejection fraction by visual inspection of 55%. No   specific abnormality of interventricular septal motion.  Mitral and   tricuspid regurgitation.  The right ventricular systolic pressure is   21 mmHg.  This is a technically suboptimal echo and subtle wall   motion changes might not be apparent on this study.     Stress Test: 2015  Small sized anteroapical partial reversibility defect consistent with    ischemia and infarction in the territory of the distal LAD .    Small-moderate sized inferolateral partial reversibility defect consistent    with ischemia and infarction in the territory of the mid LCx and/or RCA .    Left ventricular ejection fraction of 57 %.        Cardiac MRI:1/2013      FINDINGS: No cardiac chamber enlargement is identified. No pericardial effusion is identified. No axillary, mediastinal or hilar lymphadenopathy is identified. No pleural   effusions are    identified.  The thoracic aorta is without evidence of any aneurysm. No valvular stenosis or    regurgitation is identified.           There is no evidence of any delayed myocardial enhancement. No abnormal wall motion or    contractility is identified. The left ventricular ejection fraction is calculated at 41%    however visually it is greater than 55%. The abnormal calculated ejection fraction is    attributed to the difficulty with cardiac gating of the pulse sequences.         1. Normal cardiac MRI with normal cardiac chamber size, contractility, and wall motion. 2. No evidence of any myocardial scarring.         Cath: Angiogram 1/23/15: LVEDP - 15. LVgram - normal with EF 55%. 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 RCA - extremely large and ectatic with 60% mid lesion with normal FFR of .94-. 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 The bone windows show no acute or focal abnormality.       The liver demonstrates diffusely decreased attenuation suggestive   of fatty infiltration. No focal hepatic lesions are seen. The   gallbladder, adrenal glands, spleen, and pancreas are   unremarkable.       Multiple exophytic bilateral renal cortical lesions are identified   most compatible with cysts. The small and large bowel show normal   caliber and wall thickness. The appendix appears normal. There is   scattered colonic diverticula noted with no CT evidence of active   inflammation. There is  dilation of the infrarenal abdominal aorta   measuring up to 3.2 x 3.0 cm in dimension.       No free fluid or adenopathy is seen.         CTA abd pel w con: 3/2015      Coronary arterial calcifications are noted. The visualized lung   bases are clear.  Bone windows show no acute or focal abnormality.       The liver, spleen, pancreas, and kidneys enhance homogeneously   with the exception of multiple bilateral renal cortical cysts. The   adrenal glands and gallbladder show no abnormality. Evaluation of   the bowel is limited due to a lack of administration of oral   contrast. No dilated loops of bowel are identified. The appendix   appears normal. There are a few scattered colonic diverticula   which show no evidence of active inflammation.       There is an infrarenal abdominal aortic aneurysm which measures up   to 3.4 x 3.5 cm, slightly increased in size since the prior study   at which time it measured 3.2 x 3.1 cm at the same level. The   common iliac arteries are nondilated.       No free fluid or adenopathy is seen.      CTA chest abd aorta with runoff: 22  1. 9 cm long fusiform infrarenal abdominal aortic aneurysm, maximum   transverse diameter of 6.9 cm.  Moderate intramural thrombus in the aneurysm,   with patent lumen of 5.6 x 3.4 cm.   2. 9 cm in long fusiform distal SFA-popliteal artery aneurysm maximum   diameter of 4.2 cm.  Extensive circumferential intramural thrombus in the   aneurysm, with patent lumen of 1 cm distally. .   3. Bilateral calf vessels difficult to assess due to limited contrast   opacification.  No delayed images available. 4. Bilateral renal simple cysts. 5. Colonic diverticulosis.       All above diagnostic testing and laboratory data was independently visualized and reviewed by me (not simply review of report)         Assessment and Plan      1]CAD   Worsening symptoms  Abnormal CTA      I have reviewed the history and physical and examined the patient and find no relevant changes. I have reviewed with the patient and/or family the risks, benefits, and alternatives to the procedure.     Pre-sedation Assessment    Patient:  Tate Montero   :   1955  Intended Procedure: coronary angiogram     There were no vitals filed for this visit. Nursing notes reviewed and agreed. Medications reviewed  Allergies:    Allergies   Allergen Reactions    Lipitor [Atorvastatin] Other (See Comments)         Pre-Procedure Assessment/Plan:  ASA 3 - Patient with moderate systemic disease with functional limitations    Mallampati Airway Assessment:  Mallampati Class I - (soft palate, fauces, uvula & anterior/posterior tonsillar pillars are visible)    Level of Sedation Plan:Mild sedation    Post Procedure plan: Return to same level of care    Major Alanis MD [facility], Interventional Cardiology, and Peripheral Vascular [address] [facility] [address] [facility]   (C): [phone]  (O): [phone]

## 2022-06-17 NOTE — OP NOTE
[address]   Procedure Note    CLINICAL HISTORY:       Brooke Ray is a 73 y.o. male with a history of coronary artery disease. He was admitted with complaints of chest pain. Cardiac markers were negative. EKG showed nonspecific ST-T wave abnormality. Patient Active Problem List   Diagnosis    Ileus ([facility] [facility]    Paroxysmal VT ([facility]    Chest pain    CAD (coronary artery disease)    V-tach [facility])    Palpitations    Encounter for electronic analysis of reveal event recorder    Paroxysmal atrial fibrillation (HCC)    Sleep apnea    AAA (abdominal aortic aneurysm) without rupture ([facility]    Abdominal aortic aneurysm (AAA) greater than 5.5 cm in diameter in male [facility])    Aneurysm of left popliteal artery ([facility]       Prior to Admission medications    Medication Sig Start Date End Date Taking?  Authorizing Provider   omeprazole (PRILOSEC) 20 MG delayed release capsule Take 1 capsule by mouth every morning (before breakfast) 1/2 hour before breakfast 3/16/22   Bella Del Angel MD   Metoprolol Succinate 50 MG CS24 Take by mouth    Historical Provider, MD   aspirin 81 MG EC tablet Take 81 mg by mouth daily    Historical Provider, MD   lisinopril-hydroCHLOROthiazide (PRINZIDE;ZESTORETIC) 10-12.5 MG per tablet Take 1 tablet by mouth 2 times daily 2/24/22   Deep Roman MD   Mis Natural Products (Ipsum MSM FORMULA PO) Take 750 mg by mouth 2 times daily    Historical Provider, MD   Cholecalciferol (VITAMIN D3) 50 MCG (2000 UT) CAPS Take by mouth daily    Historical Provider, MD   Nutritional Supplements (CHLORELLA-SPIRULINA COMPLEX PO) Take 6 tablets by mouth 2 times daily    Historical Provider, MD   Flax OIL Take 1,650 mg by mouth 2 softgel in am, 1 in pm    Historical Provider, MD   metoprolol tartrate (LOPRESSOR) 25 MG tablet 1 tab po as needed for palpitations 8/20/21   ANTON Cali - CNP   mexiletine (MEXITIL) 150 MG capsule TAKE 1 CAPSULE BY MOUTH TWICE A DAY 5/18/21   Medardo Posada ANTON Coats - CNP   clopidogrel (PLAVIX) 75 MG tablet TAKE 1 TABLET DAILY 11/20/17   Binta Grubbs MD          The risks, benefits, and details of the procedure were explained to the patient. The patient verbalized understanding and wanted to proceed. Informed written consent was obtained. INDICATION:  Unstable angina    PROCEDURES PERFORMED:   Coronary angiogram   FFR  PCI     PROCEDURE TECHNIQUE:  The patient was approached from the right radial artery using a 5-6  Liberian slender sheath. Left coronary angiography was done using a Chase L3.5 diagnostic catheter. Right coronary angiography was done using a Chase R4 guide catheter. CONTRAST:  Total of 90 cc. COMPLICATIONS:  None. At the end of the procedure a  device was used for hemostasis. EBL: 10 cc    ANGIOGRAM/CORONARY ARTERIOGRAM:       The left main coronary artery is a distal 50% lesion    The left anterior descending artery is diffusely diseased with a mid 80% lesion. .    The left circumflex artery is small vessel. The right coronary artery is a large dominant vessel with diffuse severe disease however no significant obstruction. INTERVENTION  Guide catheter: 6 Liberian XB 3 5  FFR was performed of the LAD which was physiologic significant at 0.75  Lesion was predilated with a 4.0 shockwave lithotripsy balloon, the LAD  A [place] 3.5 x 18 mm drug-eluting stent was deployed to the mid LAD  Was postdilated with 3 5 NC balloon    SUMMARY:   Status post successful PCI of the mid LAD x1 drug-eluting stent    RECOMMENDATION:  .    1. Recommend beta blockade, high potency statin, aspirin and brilinta for 12 months  2. Referral to cardiac rehab placed  3. Patient has been advised on the importance of regular exercise of at least 20-30 minutes daily.    5. Follow up in 1-2 weeks with cardiology    MD [address], Interventional Cardiology, and Peripheral Vascular [address] [facility]   (C): [phone]  (O): [phone]

## 2022-06-20 LAB — POC ACT LR: >400 SEC

## 2022-07-07 ENCOUNTER — TELEPHONE (OUTPATIENT)
Dept: CARDIOLOGY CLINIC | Age: 67
End: 2022-07-07

## 2022-07-07 NOTE — TELEPHONE ENCOUNTER
Requested records faxed to Mone Plummer RN c/o [facility] [phone]. Fax confirmation rec'd. Scanned into Epic. Nate Araujo

## 2022-07-18 ENCOUNTER — OFFICE VISIT (OUTPATIENT)
Dept: CARDIOLOGY CLINIC | Age: 67
End: 2022-07-18
Payer: COMMERCIAL

## 2022-07-18 VITALS
SYSTOLIC BLOOD PRESSURE: 130 MMHG | BODY MASS INDEX: 36.98 KG/M2 | DIASTOLIC BLOOD PRESSURE: 84 MMHG | HEART RATE: 65 BPM | OXYGEN SATURATION: 97 % | HEIGHT: 72 IN | WEIGHT: 273 LBS

## 2022-07-18 DIAGNOSIS — I47.29 PAROXYSMAL VT: ICD-10-CM

## 2022-07-18 DIAGNOSIS — G47.30 SLEEP APNEA, UNSPECIFIED TYPE: ICD-10-CM

## 2022-07-18 DIAGNOSIS — E78.5 HYPERLIPIDEMIA LDL GOAL <70: ICD-10-CM

## 2022-07-18 DIAGNOSIS — I25.10 CORONARY ARTERY DISEASE INVOLVING NATIVE CORONARY ARTERY OF NATIVE HEART WITHOUT ANGINA PECTORIS: Primary | ICD-10-CM

## 2022-07-18 DIAGNOSIS — I10 ESSENTIAL HYPERTENSION: ICD-10-CM

## 2022-07-18 DIAGNOSIS — I71.40 AAA (ABDOMINAL AORTIC ANEURYSM) WITHOUT RUPTURE: ICD-10-CM

## 2022-07-18 DIAGNOSIS — R22.30 AXILLARY MASS, UNSPECIFIED LATERALITY: ICD-10-CM

## 2022-07-18 DIAGNOSIS — I72.4 ANEURYSM OF LEFT POPLITEAL ARTERY (HCC): ICD-10-CM

## 2022-07-18 PROCEDURE — 93000 ELECTROCARDIOGRAM COMPLETE: CPT | Performed by: INTERNAL MEDICINE

## 2022-07-18 PROCEDURE — 1124F ACP DISCUSS-NO DSCNMKR DOCD: CPT | Performed by: INTERNAL MEDICINE

## 2022-07-18 PROCEDURE — 99214 OFFICE O/P EST MOD 30 MIN: CPT | Performed by: INTERNAL MEDICINE

## 2022-07-18 RX ORDER — NITROGLYCERIN 0.4 MG/1
0.4 TABLET SUBLINGUAL EVERY 5 MIN PRN
COMMUNITY

## 2022-07-18 RX ORDER — CLOPIDOGREL BISULFATE 75 MG/1
75 TABLET ORAL DAILY
Qty: 90 TABLET | Refills: 1 | Status: SHIPPED | OUTPATIENT
Start: 2022-07-18

## 2022-07-18 RX ORDER — ISOSORBIDE MONONITRATE 60 MG/1
60 TABLET, EXTENDED RELEASE ORAL DAILY
COMMUNITY
End: 2022-08-04 | Stop reason: SDUPTHER

## 2022-07-18 NOTE — PROGRESS NOTES
Cardiology Follow    Jessy Olp  1955    PCP: Carolyn Loo MD  Referring Physician: Dr. Xiomara Warren   Reason for Referral: CAD, HTN, AAA     Chief Complaint:   Chief Complaint   Patient presents with    Follow-Up from Hospital     F/u LHC/PCI     Subjective:     History of Present Illness: The patient is 73 y.o. male with a past medical history significant for NSVT/PVC, GERD, metastatic squamous cell carcinoma (unknown origin) s/p EGD/colonoscopy with Dr. Rica Galvez on 3/16/22 with no primary GI malignancy, HLD, HTN, former smoker, sleep apea, CAD, left popliteal artery aneurysm and abdominal aortic aneurysm s/p repair on 3/4/22 (Dr. Kamila Dao). He has axillary mass and started on immunotherapy with plan to undergo chemotherapy prior to mass excision. Planning to have popliteal artery aneurysm repaired as well. He returned in follow up for evaluation of chest pain. Patient stated he was on the table to have left pop aneurysm fixed and began having chest pains with radiation down his left arm. He stated that he has been having these symptoms with exertion and symptoms have progressed. He underwent angiography that resulted in PCI mid LAD and presents today for a follow up. He sought treatment in the ED at [facility] 7/7/22 and underwent angiography that showed a patent stent. He states he is feeling better and denies any worsening chest pains or shortness of breath. He reports compliance with his medications and tolerating. Patient denies exertional chest pain/pressure, dyspnea at rest, SIERRA, PND, orthopnea, palpitations, lightheadedness, weight changes, changes in LE edema, and syncope.      Past Medical History:   Diagnosis Date    Acid reflux 1996    Surgey to have stomach wrapped, unable to reguritat    Aortic aneurysm ([address]     Arrhythmia 11/25/2012    runs of vtach    Hyperlipidemia     on fish oil    Hypertension     MI (myocardial infarction) ([address]     x2     Past Surgical History: Procedure Laterality Date    ABDOMINAL AORTIC ANEURYSM REPAIR, ENDOVASCULAR N/A 2022    ENDOVASCULAR REPAIR OF ABDOMINAL AORTIC ANEURYSM performed by Paola Butler DO at [address] [facility] N/A 2022    COLONOSCOPY WITH BIOPSY performed by Ryan Galindo MD at [facility] 131 CATH LAB PROCEDURE      HERNIA REPAIR      INSERTABLE CARDIAC MONITOR      TONSILLECTOMY      UMBILICAL HERNIA REPAIR      UPPER GASTROINTESTINAL ENDOSCOPY N/A 2022    EGD BIOPSY performed by Ryan Galindo MD at [facility] 23     Family History   Problem Relation Age of Onset    Other Father     Other Sister     Heart Disease Neg Hx      Social History     Tobacco Use    Smoking status: Former     Packs/day: 0.20     Years: 25.00     Pack years: 5.00     Types: Cigarettes     Quit date: 3/1/2010     Years since quittin.3    Smokeless tobacco: Never   Vaping Use    Vaping Use: Never used   Substance Use Topics    Alcohol use: No     Allergies   Allergen Reactions    Lipitor [Atorvastatin] Other (See Comments)     Current Outpatient Medications   Medication Sig Dispense Refill    sodium chloride 0.9 % SOLN 100 mL with pembrolizumab 100 MG/4ML SOLN Infuse intravenously Every 3 weeks      isosorbide mononitrate (IMDUR) 60 MG extended release tablet Take 60 mg by mouth in the morning. nitroGLYCERIN (NITROSTAT) 0.4 MG SL tablet Place 0.4 mg under the tongue every 5 minutes as needed for Chest pain up to max of 3 total doses. If no relief after 1 dose, call 911.       metoprolol tartrate (LOPRESSOR) 25 MG tablet Take 25 mg by mouth as needed For palpitations      ticagrelor (BRILINTA) 90 MG TABS tablet Take 1 tablet by mouth 2 times daily 60 tablet 3    omeprazole (PRILOSEC) 20 MG delayed release capsule Take 1 capsule by mouth every morning (before breakfast) 1/2 hour before breakfast 90 capsule 1    Metoprolol Succinate 50 MG CS24 Take 1 tablet by mouth daily      aspirin 81 MG EC tablet Take 81 mg by mouth daily      lisinopril-hydroCHLOROthiazide (PRINZIDE;ZESTORETIC) 10-12.5 MG per tablet Take 1 tablet by mouth 2 times daily (Patient taking differently: Take 1 tablet by mouth in the morning.) 90 tablet 1    Misc Natural Products (GLUCOSAMINE CHOND MSM FORMULA PO) Take 750 mg by mouth 2 times daily      vitamin D3 (CHOLECALCIFEROL) 25 MCG (1000 UT) TABS tablet Take by mouth daily      Nutritional Supplements (CHLORELLA-SPIRULINA COMPLEX PO) Take 6 tablets by mouth 2 times daily      Flax OIL Take 1,650 mg by mouth 2 softgel in am, 1 in pm      mexiletine (MEXITIL) 150 MG capsule TAKE 1 CAPSULE BY MOUTH TWICE A  capsule 1     No current facility-administered medications for this visit. Review of Systems:  Constitutional: No unanticipated weight loss. There's been no change in energy level, sleep pattern, or activity level. No fevers, chills. Eyes: No visual changes or diplopia. No scleral icterus. ENT: No Headaches, hearing loss or vertigo. No mouth sores or sore throat. Cardiovascular: as reviewed in HPI  Respiratory: No cough or wheezing, no sputum production. No hemoptysis. Gastrointestinal: No abdominal pain, appetite loss, blood in stools. No change in bowel or bladder habits. Genitourinary: No dysuria, trouble voiding, or hematuria. Musculoskeletal:  No gait disturbance, no joint complaints. Integumentary: No rash or pruritis. Neurological: No headache, diplopia, change in muscle strength, numbness or tingling. Psychiatric: No anxiety or depression. Endocrine: No temperature intolerance. No excessive thirst, fluid intake, or urination. No tremor. Hematologic/Lymphatic: No abnormal bruising or bleeding, blood clots or swollen lymph nodes. Allergic/Immunologic: No nasal congestion or hives.     Physical Exam:   /84   Pulse 65   Ht 6' (1.829 m)   Wt 273 lb (123.8 kg)   SpO2 97%   BMI 37.03 kg/m²   Wt Readings from Last 3 Encounters:   07/18/22 273 lb (123.8 kg)   06/17/22 275 lb (124.7 kg)   05/26/22 261 lb (118.4 kg)     Constitutional: He is oriented to person, place, and time. He appears well-developed and well-nourished. In no acute distress. Head: Normocephalic and atraumatic. Pupils equal and round. Neck: Neck supple. No JVP or carotid bruit appreciated. No mass and no thyromegaly present. No lymphadenopathy present. Cardiovascular: Normal rate. Normal heart sounds. Exam reveals no gallop and no friction rub. No murmur heard. Pulmonary/Chest: Effort normal and breath sounds normal. No respiratory distress. He has no wheezes, rhonchi or rales. Abdominal: Soft, non-tender. Bowel sounds are normal. He exhibits no organomegaly, mass or bruit. Extremities: No edema. No cyanosis or clubbing. Pulses are 2+ radial/carotid bilaterally. Neurological: No gross cranial nerve deficit. Coordination normal.   Skin: Skin is warm and dry. There is no rash or diaphoresis. Psychiatric: He has a normal mood and affect. His speech is normal and behavior is normal.     Lab Review:    Lab Results   Component Value Date/Time    TRIG 188 11/26/2012 06:45 AM    HDL 32 02/11/2020 09:51 AM    LDLCALC 108 02/11/2020 09:51 AM    LABVLDL 34 02/11/2020 09:51 AM     Lab Results   Component Value Date/Time    BUN 20 06/17/2022 01:58 PM    CREATININE 0.9 06/17/2022 01:58 PM     Lab Results   Component Value Date/Time    TROPONINI <0.01 11/26/2012 06:45 AM    TROPONINI 0.00 11/25/2012 04:17 AM    LABA1C 5.6 02/11/2020 09:51 AM      No results found for: CBCAUTODIF    EKG Interpretation: reviewed EKG completed on 3/3/22  7/18/22 sinus bradycardia,     Cath: Angiogram 1/23/15: LVEDP - 15. LVgram - normal with EF 55%. 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,non obstructive coronary artery disease with RCA,Cx,LAD; hx cardiac cath in the ,no PCI at that time     Cath: 22   Mid LAD stent    Cath:22 ([place])   MLI = Minor Luminal Irregularities, without any identifiable stenosis   CORONARY: LM: MLI essentially just gives off the LAD the more distal segment there is a 30 to 40% stenosis PROX LAD: MLI, short MID LAD: Patent LAD stent DISTAL LAD: MLI D1: 1020%   D2: 10 to 20% D3: MLI Circumflex is smaller anomalous off the right coronary cusp PROX RCA: 20%, very aneurysm MID RCA: Focal 50 to 60% stenosis with eccentric plaque, aneurysm DISTAL RCA: 20%, aneurysmal PDA: 20% PAV% PL1: 20% PL2: MLI DOMINANCE: Right     Echo 22 ([place])   Left ventricle: The cavity size was normal. Wall thickness was normal. Systolic function was normal. The estimated ejection fraction was in the range of 55% to 60%. Wall motion was normal; there were no regional wall motion abnormalities. Unable to assess diastolic function. Right ventricle: The cavity size was mildly dilated. Wall thickness was normal. Systolic function was normal. Tricuspid valve: The tricuspid jet envelope definition was inadequate to estimate right ventricular systolic pressure. Pulmonary arteries: Systolic pressure could not be accurately estimated. Pericardium, extracardiac: There was no pericardial effusion.       All above diagnostic testing and laboratory data was independently visualized and reviewed by me (not simply review of report)       Assessment and Plan     1) Infrarenal abdominal aortic aneurysm / left popliteal artery aneurysm   -1st imaged   -CTA chest/abd/aorta w runoff completed 22  -endovascular repair of abdominal aortic aneurysm with Dr. Dipika Sawyer on 3/4/22  -plans to repair left popliteal artery aneurysm, management per Vascular   -continue medical threapy    2) CAD  -s/p mid LAD stent   -asymptomatic but continued dyspnea likely related to the Brilinta   -continue with medical management and risk factor modification   -continue aspirin, imdur, and B-blocker  -DAPT therapy for minimum of 1 year  -will switch Brilinta to Plavix after 30 days with reports of dyspnea     3) HLD, unspecified  -reviewed lipid panel completed on 2/2/22 ([facility])  -LDL 73 ([place])   -patient reports intolerance to statins with myalgias  -will pursue insurance approval for PCSK9-inhibitors     4) HTN, essential   -BP stable  -Continue current medical therapy     5) Former smoker  -encouraged smoking cessation. 6) sleep apnea  -reports noncompliance with CPAP  -encouraged compliance and follow up    7) NSVT/PVC//AFIB/palpitations   -managed per Dr. Dheeraj Parham     8) GERD  -managed per GI  -s/p EGD colonoscopy 3/16/22    9) Axillary mass  -squamous cell carcinoma.   -on immunotherapy to be followed by chemotherapy    We will plan follow up 6 months     Thank you very much for allowing me to participate in the care of your patient. Please do not hesitate to contact me if you have any questions. Tea Mcknight MD [place] [address], Interventional Cardiology, and Peripheral Vascular Disease   [facility]   Ph: [phone]  Fax: [phone]    This note was scribed in the presence of Dr Aditi Laurent MD by Karen Phillip RN. Physician Attestation:  The scribes documentation has been prepared under my direction and personally reviewed by me in its entirety. I confirm the note above accurately reflects all work, treatment, procedures, and medical decision making performed by me.     Electronically signed by Yandy Butterfield MD on 8/13/2022 at 10:14 PM

## 2022-07-27 ENCOUNTER — HOSPITAL ENCOUNTER (OUTPATIENT)
Dept: CARDIAC REHAB | Age: 67
Setting detail: THERAPIES SERIES
Discharge: HOME | End: 2022-07-27
Payer: COMMERCIAL

## 2022-07-27 PROCEDURE — 93798 PHYS/QHP OP CAR RHAB W/ECG: CPT

## 2022-07-29 ENCOUNTER — TELEPHONE (OUTPATIENT)
Dept: CARDIOLOGY CLINIC | Age: 67
End: 2022-07-29

## 2022-07-29 ENCOUNTER — APPOINTMENT (OUTPATIENT)
Dept: CARDIAC REHAB | Age: 67
End: 2022-07-29
Payer: COMMERCIAL

## 2022-07-29 ENCOUNTER — HOSPITAL ENCOUNTER (OUTPATIENT)
Dept: CARDIAC REHAB | Age: 67
Setting detail: THERAPIES SERIES
Discharge: HOME | End: 2022-07-29
Payer: COMMERCIAL

## 2022-07-29 PROCEDURE — 93798 PHYS/QHP OP CAR RHAB W/ECG: CPT

## 2022-07-29 NOTE — TELEPHONE ENCOUNTER
Dr Aj Shen office called  to speak to Dr Ann Lawler. She is with [address]. Gave information to Marlen ROMERO who will message Dr Ann Lawler. Dr Luh Main personal cell # [phone].

## 2022-08-01 ENCOUNTER — APPOINTMENT (OUTPATIENT)
Dept: CARDIAC REHAB | Age: 67
End: 2022-08-01
Payer: COMMERCIAL

## 2022-08-01 ENCOUNTER — HOSPITAL ENCOUNTER (OUTPATIENT)
Dept: CARDIAC REHAB | Age: 67
Setting detail: THERAPIES SERIES
Discharge: HOME | End: 2022-08-01
Payer: COMMERCIAL

## 2022-08-01 PROCEDURE — 93798 PHYS/QHP OP CAR RHAB W/ECG: CPT

## 2022-08-03 ENCOUNTER — APPOINTMENT (OUTPATIENT)
Dept: CARDIAC REHAB | Age: 67
End: 2022-08-03
Payer: COMMERCIAL

## 2022-08-03 ENCOUNTER — TELEPHONE (OUTPATIENT)
Dept: CARDIOLOGY CLINIC | Age: 67
End: 2022-08-03

## 2022-08-03 ENCOUNTER — HOSPITAL ENCOUNTER (OUTPATIENT)
Dept: CARDIAC REHAB | Age: 67
Setting detail: THERAPIES SERIES
Discharge: HOME | End: 2022-08-03
Payer: COMMERCIAL

## 2022-08-03 PROCEDURE — 93798 PHYS/QHP OP CAR RHAB W/ECG: CPT

## 2022-08-03 NOTE — TELEPHONE ENCOUNTER
Dr. María Elena Knott  specializes in Cancer surgeries wanting to talk to Dr. Carlos Abdullahi personally and prefers not to talk to any other doctor at this time. Wants to talk about medications and possible surgery. Did not go into any more reason.    Is okay waiting til Monday Aug 8th when Dr. Carlos Abdullahi is back at [facility] \[facility] [place] [facility]\"" cell # for doctor is [phone]

## 2022-08-04 RX ORDER — ISOSORBIDE MONONITRATE 60 MG/1
60 TABLET, EXTENDED RELEASE ORAL DAILY
Qty: 90 TABLET | Refills: 3 | Status: SHIPPED | OUTPATIENT
Start: 2022-08-04

## 2022-08-04 NOTE — TELEPHONE ENCOUNTER
Medication Refill    Medication needing refilled:  isosorbide mononitrate      Dosage of the medication:  60 MG extended release tablet      How are you taking this medication (QD, BID, TID, QID, PRN):  Take 60 mg by mouth in the morning. 30 or 90 day supply called in:  90    When will you run out of your medication:  2  days    Which Pharmacy are we sending the medication to?:  [facility]/pharmacy #[id number]- [place], [place] - [address]  Doyne Members [phone] Chris Frank [phone] [address], [place] [address]   Phone:  [phone]  Fax:  [phone]

## 2022-08-05 ENCOUNTER — APPOINTMENT (OUTPATIENT)
Dept: CARDIAC REHAB | Age: 67
End: 2022-08-05
Payer: COMMERCIAL

## 2022-08-05 ENCOUNTER — HOSPITAL ENCOUNTER (OUTPATIENT)
Dept: CARDIAC REHAB | Age: 67
Setting detail: THERAPIES SERIES
Discharge: HOME | End: 2022-08-05
Payer: COMMERCIAL

## 2022-08-05 PROCEDURE — 93798 PHYS/QHP OP CAR RHAB W/ECG: CPT

## 2022-08-08 ENCOUNTER — HOSPITAL ENCOUNTER (OUTPATIENT)
Dept: CARDIAC REHAB | Age: 67
Setting detail: THERAPIES SERIES
Discharge: HOME | End: 2022-08-08
Payer: COMMERCIAL

## 2022-08-08 ENCOUNTER — APPOINTMENT (OUTPATIENT)
Dept: CARDIAC REHAB | Age: 67
End: 2022-08-08
Payer: COMMERCIAL

## 2022-08-08 PROCEDURE — 93798 PHYS/QHP OP CAR RHAB W/ECG: CPT

## 2022-08-10 ENCOUNTER — APPOINTMENT (OUTPATIENT)
Dept: CARDIAC REHAB | Age: 67
End: 2022-08-10
Payer: COMMERCIAL

## 2022-08-10 ENCOUNTER — HOSPITAL ENCOUNTER (OUTPATIENT)
Dept: CARDIAC REHAB | Age: 67
Setting detail: THERAPIES SERIES
Discharge: HOME | End: 2022-08-10
Payer: COMMERCIAL

## 2022-08-10 PROCEDURE — 93798 PHYS/QHP OP CAR RHAB W/ECG: CPT

## 2022-08-12 ENCOUNTER — APPOINTMENT (OUTPATIENT)
Dept: CARDIAC REHAB | Age: 67
End: 2022-08-12
Payer: COMMERCIAL

## 2022-08-12 NOTE — TELEPHONE ENCOUNTER
Oncology note is available in care everywhere for your review. Please see message below that Olesyadalia Mir at [phone] would like to speak with you.

## 2022-08-15 ENCOUNTER — APPOINTMENT (OUTPATIENT)
Dept: CARDIAC REHAB | Age: 67
End: 2022-08-15
Payer: COMMERCIAL

## 2022-08-17 ENCOUNTER — APPOINTMENT (OUTPATIENT)
Dept: CARDIAC REHAB | Age: 67
End: 2022-08-17
Payer: COMMERCIAL

## 2022-08-17 ENCOUNTER — HOSPITAL ENCOUNTER (OUTPATIENT)
Dept: CARDIAC REHAB | Age: 67
Setting detail: THERAPIES SERIES
Discharge: HOME | End: 2022-08-17
Payer: COMMERCIAL

## 2022-08-17 PROCEDURE — 93798 PHYS/QHP OP CAR RHAB W/ECG: CPT

## 2022-08-19 ENCOUNTER — APPOINTMENT (OUTPATIENT)
Dept: CARDIAC REHAB | Age: 67
End: 2022-08-19
Payer: COMMERCIAL

## 2022-08-19 ENCOUNTER — HOSPITAL ENCOUNTER (OUTPATIENT)
Dept: CARDIAC REHAB | Age: 67
Setting detail: THERAPIES SERIES
Discharge: HOME | End: 2022-08-19
Payer: COMMERCIAL

## 2022-08-19 PROCEDURE — 93798 PHYS/QHP OP CAR RHAB W/ECG: CPT

## 2022-08-22 ENCOUNTER — APPOINTMENT (OUTPATIENT)
Dept: CARDIAC REHAB | Age: 67
End: 2022-08-22
Payer: COMMERCIAL

## 2022-08-24 ENCOUNTER — APPOINTMENT (OUTPATIENT)
Dept: CARDIAC REHAB | Age: 67
End: 2022-08-24
Payer: COMMERCIAL

## 2022-08-25 ENCOUNTER — OFFICE VISIT (OUTPATIENT)
Dept: VASCULAR SURGERY | Age: 67
End: 2022-08-25
Payer: COMMERCIAL

## 2022-08-25 VITALS — HEIGHT: 72 IN | BODY MASS INDEX: 37.03 KG/M2

## 2022-08-25 DIAGNOSIS — I72.4 POPLITEAL ANEURYSM (HCC): ICD-10-CM

## 2022-08-25 DIAGNOSIS — Z98.890 STATUS POST ENDOVASCULAR ANEURYSM REPAIR (EVAR): Primary | ICD-10-CM

## 2022-08-25 DIAGNOSIS — Z98.890 STATUS POST ENDOVASCULAR ANEURYSM REPAIR (EVAR): ICD-10-CM

## 2022-08-25 DIAGNOSIS — I80.01 THROMBOPHLEBITIS OF SUPERFICIAL VEINS OF RIGHT LOWER EXTREMITY: ICD-10-CM

## 2022-08-25 DIAGNOSIS — Z86.79 STATUS POST ENDOVASCULAR ANEURYSM REPAIR (EVAR): ICD-10-CM

## 2022-08-25 DIAGNOSIS — I71.40 AAA (ABDOMINAL AORTIC ANEURYSM) WITHOUT RUPTURE: ICD-10-CM

## 2022-08-25 DIAGNOSIS — Z86.79 STATUS POST ENDOVASCULAR ANEURYSM REPAIR (EVAR): Primary | ICD-10-CM

## 2022-08-25 DIAGNOSIS — L03.115 CELLULITIS OF RIGHT LOWER EXTREMITY: Primary | ICD-10-CM

## 2022-08-25 PROCEDURE — 1124F ACP DISCUSS-NO DSCNMKR DOCD: CPT | Performed by: STUDENT IN AN ORGANIZED HEALTH CARE EDUCATION/TRAINING PROGRAM

## 2022-08-25 PROCEDURE — 99214 OFFICE O/P EST MOD 30 MIN: CPT | Performed by: STUDENT IN AN ORGANIZED HEALTH CARE EDUCATION/TRAINING PROGRAM

## 2022-08-25 RX ORDER — DOXYCYCLINE HYCLATE 100 MG
100 TABLET ORAL 2 TIMES DAILY
Qty: 10 TABLET | Refills: 0 | Status: SHIPPED | OUTPATIENT
Start: 2022-08-25 | End: 2022-08-30

## 2022-08-25 NOTE — LETTER
[facility]) - Vascular and Endovascular Surgeons, [address]  [facility] [address]  Phone: [phone]  Fax: [phone]    Simona Gomez DO        August 25, 2022     Patient: Soledad Garcia   YOB: 1955   Date of Visit: 8/25/2022       To Whom It May Concern: It is my medical opinion that Lander Spurling may return to Cardiac Rehab without restriction. .    If you have any questions or concerns, please don't hesitate to call.     Sincerely,        Simona Gomez DO

## 2022-08-25 NOTE — PROGRESS NOTES
Teena Mancilla DO, FSVS, [address] [facility] Vascular and Endovascular Surgery    Samina Cano (:  1955) is a 73 y.o. male,Established patient, here for evaluation of the following chief complaint(s):  Follow-up         ASSESSMENT/PLAN:  1. Cellulitis of right lower extremity  2. Status post endovascular aneurysm repair (EVAR)  3. Popliteal aneurysm ([address]  4. Thrombophlebitis of superficial veins of right lower extremity           This is a 73year old male patient with multiple intervening medical concerns. The primary issue today is the SVT with cellulitis. Will need to keep his leg wrapped and reduced edema at the ankle. Will prescribe 5 days of doxycycline. If this is concerning given the immunotherapy he is on then can adjust of course. He is due for a follow up aortic duplex to evaluate his AAA s/p EVAR which looked good on CT in May of this year. He still needs a popliteal stent placed versus possible fem-pop bypass on the left side. Will continue to follow his clinical course as his original plan was to have the surgical mass and lymph nodes excised prior to performing the popliteal stent placement. All questions answered. Subjective   SUBJECTIVE/OBJECTIVE:  This is a 73year old male patient who presents to the office today to discuss his right leg. He states that last week he was at cardiopulmonary rehab for having a recent coronary artery stent placed. He may have pulled a muscle in his right leg. He began to accumulate pain and therefore went to an ER on Monday and had a right leg venous duplex which discovered an SVT in the small saphenous vein. No DVT was present. He admits to some calf and right distal lateral ankle pain and swelling. No acute worsening of swelling and pain.  He is s/p EVAR for AAA and still needs his popliteal aneurysm repaired on the left side but he is also dealing with cancer diagnosis and planned treatment with excision of mass and lymph nodes but all of this has since been tabled because of his recent coronary interventions. He is on aspirin and plavix. Objective   Physical Exam  Constitutional:       Appearance: He is obese. Cardiovascular:      Rate and Rhythm: Normal rate and regular rhythm. Pulses: Normal pulses. Pulmonary:      Effort: Pulmonary effort is normal. No respiratory distress. Musculoskeletal:         General: Swelling (right lateral distal ankle) present. Normal range of motion. Skin:     General: Skin is warm and dry. Capillary Refill: Capillary refill takes less than 2 seconds. Findings: Erythema (blanchable erythema over right distal ankle) present. Neurological:      Mental Status: He is alert. On this date 8/25/2022 I have spent 35 minutes reviewing previous notes, test results and face to face with the patient discussing the diagnosis and importance of compliance with the treatment plan as well as documenting on the day of the visit.     Oswald Pastrana DO, FSVS, [address] [facility] Vascular and Endovascular Surgery

## 2022-08-26 ENCOUNTER — APPOINTMENT (OUTPATIENT)
Dept: CARDIAC REHAB | Age: 67
End: 2022-08-26
Payer: COMMERCIAL

## 2022-08-29 ENCOUNTER — APPOINTMENT (OUTPATIENT)
Dept: CARDIAC REHAB | Age: 67
End: 2022-08-29
Payer: COMMERCIAL

## 2022-08-29 ENCOUNTER — HOSPITAL ENCOUNTER (OUTPATIENT)
Dept: CARDIAC REHAB | Age: 67
Setting detail: THERAPIES SERIES
Discharge: HOME | End: 2022-08-29
Payer: COMMERCIAL

## 2022-08-29 PROCEDURE — 93798 PHYS/QHP OP CAR RHAB W/ECG: CPT

## 2022-08-31 ENCOUNTER — HOSPITAL ENCOUNTER (OUTPATIENT)
Dept: CARDIAC REHAB | Age: 67
Setting detail: THERAPIES SERIES
Discharge: HOME | End: 2022-08-31
Payer: COMMERCIAL

## 2022-08-31 ENCOUNTER — APPOINTMENT (OUTPATIENT)
Dept: CARDIAC REHAB | Age: 67
End: 2022-08-31
Payer: COMMERCIAL

## 2022-08-31 PROCEDURE — 93798 PHYS/QHP OP CAR RHAB W/ECG: CPT

## 2022-09-02 ENCOUNTER — APPOINTMENT (OUTPATIENT)
Dept: CARDIAC REHAB | Age: 67
End: 2022-09-02
Payer: COMMERCIAL

## 2022-09-05 ENCOUNTER — APPOINTMENT (OUTPATIENT)
Dept: CARDIAC REHAB | Age: 67
End: 2022-09-05
Payer: COMMERCIAL

## 2022-09-07 ENCOUNTER — HOSPITAL ENCOUNTER (OUTPATIENT)
Dept: CARDIAC REHAB | Age: 67
Setting detail: THERAPIES SERIES
Discharge: HOME | End: 2022-09-07
Payer: COMMERCIAL

## 2022-09-07 ENCOUNTER — APPOINTMENT (OUTPATIENT)
Dept: CARDIAC REHAB | Age: 67
End: 2022-09-07
Payer: COMMERCIAL

## 2022-09-07 PROCEDURE — 93798 PHYS/QHP OP CAR RHAB W/ECG: CPT

## 2022-09-09 ENCOUNTER — HOSPITAL ENCOUNTER (OUTPATIENT)
Dept: CARDIAC REHAB | Age: 67
Setting detail: THERAPIES SERIES
Discharge: HOME | End: 2022-09-09
Payer: COMMERCIAL

## 2022-09-09 ENCOUNTER — APPOINTMENT (OUTPATIENT)
Dept: CARDIAC REHAB | Age: 67
End: 2022-09-09
Payer: COMMERCIAL

## 2022-09-09 PROCEDURE — 93798 PHYS/QHP OP CAR RHAB W/ECG: CPT

## 2022-09-12 ENCOUNTER — APPOINTMENT (OUTPATIENT)
Dept: CARDIAC REHAB | Age: 67
End: 2022-09-12
Payer: COMMERCIAL

## 2022-09-12 ENCOUNTER — HOSPITAL ENCOUNTER (OUTPATIENT)
Dept: CARDIAC REHAB | Age: 67
Setting detail: THERAPIES SERIES
Discharge: HOME | End: 2022-09-12
Payer: COMMERCIAL

## 2022-09-12 PROCEDURE — 93798 PHYS/QHP OP CAR RHAB W/ECG: CPT

## 2022-09-14 ENCOUNTER — HOSPITAL ENCOUNTER (OUTPATIENT)
Dept: CARDIAC REHAB | Age: 67
Setting detail: THERAPIES SERIES
Discharge: HOME | End: 2022-09-14
Payer: COMMERCIAL

## 2022-09-14 ENCOUNTER — APPOINTMENT (OUTPATIENT)
Dept: CARDIAC REHAB | Age: 67
End: 2022-09-14
Payer: COMMERCIAL

## 2022-09-14 PROCEDURE — 93798 PHYS/QHP OP CAR RHAB W/ECG: CPT

## 2022-09-16 ENCOUNTER — APPOINTMENT (OUTPATIENT)
Dept: CARDIAC REHAB | Age: 67
End: 2022-09-16
Payer: COMMERCIAL

## 2022-09-16 ENCOUNTER — HOSPITAL ENCOUNTER (OUTPATIENT)
Dept: CARDIAC REHAB | Age: 67
Setting detail: THERAPIES SERIES
Discharge: HOME | End: 2022-09-16
Payer: COMMERCIAL

## 2022-09-16 PROCEDURE — 93798 PHYS/QHP OP CAR RHAB W/ECG: CPT

## 2022-09-19 ENCOUNTER — APPOINTMENT (OUTPATIENT)
Dept: CARDIAC REHAB | Age: 67
End: 2022-09-19
Payer: COMMERCIAL

## 2022-09-19 ENCOUNTER — HOSPITAL ENCOUNTER (OUTPATIENT)
Dept: CARDIAC REHAB | Age: 67
Setting detail: THERAPIES SERIES
Discharge: HOME | End: 2022-09-19
Payer: COMMERCIAL

## 2022-09-19 PROCEDURE — 93798 PHYS/QHP OP CAR RHAB W/ECG: CPT

## 2022-09-20 ENCOUNTER — PROCEDURE VISIT (OUTPATIENT)
Dept: SURGERY | Age: 67
End: 2022-09-20
Payer: COMMERCIAL

## 2022-09-20 DIAGNOSIS — Z98.890 STATUS POST ENDOVASCULAR ANEURYSM REPAIR (EVAR): ICD-10-CM

## 2022-09-20 DIAGNOSIS — Z86.79 STATUS POST ENDOVASCULAR ANEURYSM REPAIR (EVAR): ICD-10-CM

## 2022-09-20 DIAGNOSIS — I71.40 AAA (ABDOMINAL AORTIC ANEURYSM) WITHOUT RUPTURE: ICD-10-CM

## 2022-09-20 PROCEDURE — 93978 VASCULAR STUDY: CPT | Performed by: SURGERY

## 2022-09-21 ENCOUNTER — HOSPITAL ENCOUNTER (OUTPATIENT)
Dept: CARDIAC REHAB | Age: 67
Setting detail: THERAPIES SERIES
Discharge: HOME | End: 2022-09-21
Payer: COMMERCIAL

## 2022-09-21 ENCOUNTER — APPOINTMENT (OUTPATIENT)
Dept: CARDIAC REHAB | Age: 67
End: 2022-09-21
Payer: COMMERCIAL

## 2022-09-21 DIAGNOSIS — Z98.890 STATUS POST ENDOVASCULAR ANEURYSM REPAIR (EVAR): Primary | ICD-10-CM

## 2022-09-21 DIAGNOSIS — I71.40 AAA (ABDOMINAL AORTIC ANEURYSM) WITHOUT RUPTURE: ICD-10-CM

## 2022-09-21 DIAGNOSIS — Z86.79 STATUS POST ENDOVASCULAR ANEURYSM REPAIR (EVAR): Primary | ICD-10-CM

## 2022-09-21 PROCEDURE — 93798 PHYS/QHP OP CAR RHAB W/ECG: CPT

## 2022-09-23 ENCOUNTER — APPOINTMENT (OUTPATIENT)
Dept: CARDIAC REHAB | Age: 67
End: 2022-09-23
Payer: COMMERCIAL

## 2022-09-26 ENCOUNTER — APPOINTMENT (OUTPATIENT)
Dept: CARDIAC REHAB | Age: 67
End: 2022-09-26
Payer: COMMERCIAL

## 2022-09-28 ENCOUNTER — APPOINTMENT (OUTPATIENT)
Dept: CARDIAC REHAB | Age: 67
End: 2022-09-28
Payer: COMMERCIAL

## 2022-09-30 ENCOUNTER — HOSPITAL ENCOUNTER (OUTPATIENT)
Dept: CARDIAC REHAB | Age: 67
Setting detail: THERAPIES SERIES
Discharge: HOME | End: 2022-09-30
Payer: COMMERCIAL

## 2022-09-30 ENCOUNTER — APPOINTMENT (OUTPATIENT)
Dept: CARDIAC REHAB | Age: 67
End: 2022-09-30
Payer: COMMERCIAL

## 2022-09-30 PROCEDURE — 93798 PHYS/QHP OP CAR RHAB W/ECG: CPT

## 2022-10-03 ENCOUNTER — HOSPITAL ENCOUNTER (OUTPATIENT)
Dept: CARDIAC REHAB | Age: 67
Setting detail: THERAPIES SERIES
Discharge: HOME | End: 2022-10-03
Payer: COMMERCIAL

## 2022-10-03 ENCOUNTER — APPOINTMENT (OUTPATIENT)
Dept: CARDIAC REHAB | Age: 67
End: 2022-10-03
Payer: COMMERCIAL

## 2022-10-03 PROCEDURE — 93798 PHYS/QHP OP CAR RHAB W/ECG: CPT

## 2022-10-05 ENCOUNTER — APPOINTMENT (OUTPATIENT)
Dept: CARDIAC REHAB | Age: 67
End: 2022-10-05
Payer: COMMERCIAL

## 2022-10-06 ENCOUNTER — HOSPITAL ENCOUNTER (OUTPATIENT)
Dept: CARDIAC REHAB | Age: 67
Setting detail: THERAPIES SERIES
Discharge: HOME | End: 2022-10-06
Payer: COMMERCIAL

## 2022-10-06 PROCEDURE — 93798 PHYS/QHP OP CAR RHAB W/ECG: CPT

## 2022-10-07 ENCOUNTER — APPOINTMENT (OUTPATIENT)
Dept: CARDIAC REHAB | Age: 67
End: 2022-10-07
Payer: COMMERCIAL

## 2022-10-07 ENCOUNTER — HOSPITAL ENCOUNTER (OUTPATIENT)
Dept: CARDIAC REHAB | Age: 67
Setting detail: THERAPIES SERIES
Discharge: HOME | End: 2022-10-07
Payer: COMMERCIAL

## 2022-10-07 PROCEDURE — 93798 PHYS/QHP OP CAR RHAB W/ECG: CPT

## 2022-10-10 ENCOUNTER — APPOINTMENT (OUTPATIENT)
Dept: CARDIAC REHAB | Age: 67
End: 2022-10-10
Payer: COMMERCIAL

## 2022-10-10 ENCOUNTER — HOSPITAL ENCOUNTER (OUTPATIENT)
Dept: CARDIAC REHAB | Age: 67
Setting detail: THERAPIES SERIES
Discharge: HOME | End: 2022-10-10
Payer: COMMERCIAL

## 2022-10-10 PROCEDURE — 93798 PHYS/QHP OP CAR RHAB W/ECG: CPT

## 2022-10-12 ENCOUNTER — APPOINTMENT (OUTPATIENT)
Dept: CARDIAC REHAB | Age: 67
End: 2022-10-12
Payer: COMMERCIAL

## 2022-10-12 ENCOUNTER — HOSPITAL ENCOUNTER (OUTPATIENT)
Dept: CARDIAC REHAB | Age: 67
Setting detail: THERAPIES SERIES
Discharge: HOME | End: 2022-10-12
Payer: COMMERCIAL

## 2022-10-12 PROCEDURE — 93798 PHYS/QHP OP CAR RHAB W/ECG: CPT

## 2022-10-14 ENCOUNTER — APPOINTMENT (OUTPATIENT)
Dept: CARDIAC REHAB | Age: 67
End: 2022-10-14
Payer: COMMERCIAL

## 2022-10-17 ENCOUNTER — HOSPITAL ENCOUNTER (OUTPATIENT)
Dept: CARDIAC REHAB | Age: 67
Setting detail: THERAPIES SERIES
Discharge: HOME | End: 2022-10-17
Payer: COMMERCIAL

## 2022-10-17 ENCOUNTER — APPOINTMENT (OUTPATIENT)
Dept: CARDIAC REHAB | Age: 67
End: 2022-10-17
Payer: COMMERCIAL

## 2022-10-17 PROCEDURE — 93798 PHYS/QHP OP CAR RHAB W/ECG: CPT

## 2022-10-19 ENCOUNTER — HOSPITAL ENCOUNTER (OUTPATIENT)
Dept: CARDIAC REHAB | Age: 67
Setting detail: THERAPIES SERIES
Discharge: HOME | End: 2022-10-19
Payer: COMMERCIAL

## 2022-10-19 PROCEDURE — 93798 PHYS/QHP OP CAR RHAB W/ECG: CPT

## 2022-10-21 ENCOUNTER — HOSPITAL ENCOUNTER (OUTPATIENT)
Dept: CARDIAC REHAB | Age: 67
Setting detail: THERAPIES SERIES
Discharge: HOME | End: 2022-10-21
Payer: COMMERCIAL

## 2022-10-21 PROCEDURE — 93798 PHYS/QHP OP CAR RHAB W/ECG: CPT

## 2022-10-24 ENCOUNTER — HOSPITAL ENCOUNTER (OUTPATIENT)
Dept: CARDIAC REHAB | Age: 67
Setting detail: THERAPIES SERIES
Discharge: HOME | End: 2022-10-24
Payer: COMMERCIAL

## 2022-10-24 PROCEDURE — 93798 PHYS/QHP OP CAR RHAB W/ECG: CPT

## 2022-10-26 ENCOUNTER — APPOINTMENT (OUTPATIENT)
Dept: CARDIAC REHAB | Age: 67
End: 2022-10-26
Payer: COMMERCIAL

## 2022-10-28 ENCOUNTER — HOSPITAL ENCOUNTER (OUTPATIENT)
Dept: CARDIAC REHAB | Age: 67
Setting detail: THERAPIES SERIES
Discharge: HOME | End: 2022-10-28
Payer: COMMERCIAL

## 2022-10-28 PROCEDURE — 93798 PHYS/QHP OP CAR RHAB W/ECG: CPT

## 2022-10-31 ENCOUNTER — HOSPITAL ENCOUNTER (OUTPATIENT)
Dept: CARDIAC REHAB | Age: 67
Setting detail: THERAPIES SERIES
Discharge: HOME | End: 2022-10-31
Payer: COMMERCIAL

## 2022-10-31 PROCEDURE — 93798 PHYS/QHP OP CAR RHAB W/ECG: CPT

## 2022-11-02 ENCOUNTER — HOSPITAL ENCOUNTER (OUTPATIENT)
Dept: CARDIAC REHAB | Age: 67
Setting detail: THERAPIES SERIES
Discharge: HOME | End: 2022-11-02
Payer: COMMERCIAL

## 2022-11-02 PROCEDURE — 93798 PHYS/QHP OP CAR RHAB W/ECG: CPT

## 2022-11-04 ENCOUNTER — APPOINTMENT (OUTPATIENT)
Dept: CARDIAC REHAB | Age: 67
End: 2022-11-04
Payer: COMMERCIAL

## 2022-11-07 ENCOUNTER — HOSPITAL ENCOUNTER (OUTPATIENT)
Dept: CARDIAC REHAB | Age: 67
Setting detail: THERAPIES SERIES
Discharge: HOME | End: 2022-11-07
Payer: COMMERCIAL

## 2022-11-07 PROCEDURE — 93798 PHYS/QHP OP CAR RHAB W/ECG: CPT

## 2022-11-09 ENCOUNTER — HOSPITAL ENCOUNTER (OUTPATIENT)
Dept: CARDIAC REHAB | Age: 67
Setting detail: THERAPIES SERIES
Discharge: HOME | End: 2022-11-09
Payer: COMMERCIAL

## 2022-11-09 PROCEDURE — 93798 PHYS/QHP OP CAR RHAB W/ECG: CPT

## 2022-11-11 ENCOUNTER — HOSPITAL ENCOUNTER (OUTPATIENT)
Dept: CARDIAC REHAB | Age: 67
Setting detail: THERAPIES SERIES
Discharge: HOME | End: 2022-11-11
Payer: COMMERCIAL

## 2022-11-11 PROCEDURE — 93798 PHYS/QHP OP CAR RHAB W/ECG: CPT

## 2022-11-14 ENCOUNTER — TELEPHONE (OUTPATIENT)
Dept: CARDIOLOGY CLINIC | Age: 67
End: 2022-11-14

## 2022-11-14 ENCOUNTER — HOSPITAL ENCOUNTER (OUTPATIENT)
Dept: CARDIAC REHAB | Age: 67
Setting detail: THERAPIES SERIES
Discharge: HOME | End: 2022-11-14
Payer: COMMERCIAL

## 2022-11-14 PROCEDURE — 93798 PHYS/QHP OP CAR RHAB W/ECG: CPT

## 2022-11-14 RX ORDER — MEXILETINE HYDROCHLORIDE 150 MG/1
150 CAPSULE ORAL 2 TIMES DAILY
Qty: 180 CAPSULE | Refills: 0 | Status: SHIPPED | OUTPATIENT
Start: 2022-11-14

## 2022-11-14 NOTE — TELEPHONE ENCOUNTER
Pt called stating they need refill on the   Pt also stated they are having PVC's      mexiletine (MEXITIL) 150 MG capsule   As Directed  180 capsule  TAKE 1 CAPSULE BY MOUTH TWICE A DAY    [facility]/pharmacy #[id number]- [place], [place] - [address]  [facility] [phone] Angely [facility] [phone]   [address], [place] [address]   Phone:  [phone]  Fax:  [phone]

## 2022-11-16 ENCOUNTER — HOSPITAL ENCOUNTER (OUTPATIENT)
Dept: CARDIAC REHAB | Age: 67
Setting detail: THERAPIES SERIES
Discharge: HOME | End: 2022-11-16
Payer: COMMERCIAL

## 2022-11-16 PROCEDURE — 93798 PHYS/QHP OP CAR RHAB W/ECG: CPT

## 2022-12-05 ENCOUNTER — OFFICE VISIT (OUTPATIENT)
Dept: CARDIOLOGY CLINIC | Age: 67
End: 2022-12-05
Payer: COMMERCIAL

## 2022-12-05 VITALS
WEIGHT: 269 LBS | HEART RATE: 67 BPM | BODY MASS INDEX: 36.44 KG/M2 | DIASTOLIC BLOOD PRESSURE: 82 MMHG | OXYGEN SATURATION: 94 % | HEIGHT: 72 IN | SYSTOLIC BLOOD PRESSURE: 134 MMHG

## 2022-12-05 DIAGNOSIS — E78.5 HYPERLIPIDEMIA LDL GOAL <70: ICD-10-CM

## 2022-12-05 DIAGNOSIS — I72.4 ANEURYSM OF LEFT POPLITEAL ARTERY (HCC): ICD-10-CM

## 2022-12-05 DIAGNOSIS — I25.10 CORONARY ARTERY DISEASE INVOLVING NATIVE CORONARY ARTERY OF NATIVE HEART WITHOUT ANGINA PECTORIS: Primary | ICD-10-CM

## 2022-12-05 DIAGNOSIS — I48.0 PAROXYSMAL ATRIAL FIBRILLATION (HCC): ICD-10-CM

## 2022-12-05 DIAGNOSIS — R07.9 CHEST PAIN, UNSPECIFIED TYPE: ICD-10-CM

## 2022-12-05 DIAGNOSIS — I71.40 ABDOMINAL AORTIC ANEURYSM (AAA) WITHOUT RUPTURE, UNSPECIFIED PART: ICD-10-CM

## 2022-12-05 DIAGNOSIS — I10 ESSENTIAL HYPERTENSION: ICD-10-CM

## 2022-12-05 PROCEDURE — 1124F ACP DISCUSS-NO DSCNMKR DOCD: CPT | Performed by: INTERNAL MEDICINE

## 2022-12-05 PROCEDURE — 3074F SYST BP LT 130 MM HG: CPT | Performed by: INTERNAL MEDICINE

## 2022-12-05 PROCEDURE — 3078F DIAST BP <80 MM HG: CPT | Performed by: INTERNAL MEDICINE

## 2022-12-05 PROCEDURE — 93000 ELECTROCARDIOGRAM COMPLETE: CPT | Performed by: INTERNAL MEDICINE

## 2022-12-05 PROCEDURE — 99214 OFFICE O/P EST MOD 30 MIN: CPT | Performed by: INTERNAL MEDICINE

## 2022-12-05 RX ORDER — MEXILETINE HYDROCHLORIDE 150 MG/1
150 CAPSULE ORAL 2 TIMES DAILY
Qty: 180 CAPSULE | Refills: 5 | Status: SHIPPED | OUTPATIENT
Start: 2022-12-05

## 2023-01-31 RX ORDER — CLOPIDOGREL BISULFATE 75 MG/1
TABLET ORAL
Qty: 90 TABLET | Refills: 3 | Status: SHIPPED | OUTPATIENT
Start: 2023-01-31

## 2023-02-01 ENCOUNTER — TELEPHONE (OUTPATIENT)
Dept: CARDIOLOGY CLINIC | Age: 68
End: 2023-02-01

## 2023-02-01 NOTE — TELEPHONE ENCOUNTER
Submitted PA for REPATHA  Via [facility] Key: [id number] STATUS: APPROVED FROM     PA Case: [id number], Status: Approved, Coverage Starts on: 2/7/2023 12:00:00 AM, Coverage Ends on: 2/7/2024 12:00:00 AM.

## 2023-02-10 RX ORDER — EVOLOCUMAB 140 MG/ML
INJECTION, SOLUTION SUBCUTANEOUS
Qty: 280 ML | Refills: 5 | Status: SHIPPED | OUTPATIENT
Start: 2023-02-10

## 2023-03-06 ENCOUNTER — PATIENT MESSAGE (OUTPATIENT)
Dept: CARDIOLOGY CLINIC | Age: 68
End: 2023-03-06

## 2023-03-06 NOTE — TELEPHONE ENCOUNTER
From: Princess Escamilla  To: Dr. Esteban Romano: 3/6/2023 1:22 PM EST  Subject: Laverne Reilly refill    Dr Yuan Figueredo is telling me that my insurance company will not refill the [address] [place] [address]. without a physicians authorization. They need a phone call from you in order to do this. I use [facility] in [address] Their phone number is. [phone]. Thank you for your help with this, I hope all is well with you. Abelardo Bajwa [phone] is my contact number.

## 2023-03-08 NOTE — TELEPHONE ENCOUNTER
Repatha Approved    Submitted PA for REPATHA  Via [facility] Key: [id number] STATUS: APPROVED FROM      PA Case: [id number], Status: Approved, Coverage Starts on: 2/7/2023 12:00:00 AM, Coverage Ends on: 2/7/2024 12:00:00 AM    Called pharmacy to confirm receipt of PA. They have received it but error in how script is written. Patient is prescribed 140mg/ml Med is dispensed by ml but script sent dispensed by mg. Verbal correction authorized,    Inject 1ml every 14 days - dispensed 2 ml with 5 refills. pharmacy will proceed with filling request.     Patient notified.

## 2023-05-26 RX ORDER — NITROGLYCERIN 0.4 MG/1
0.4 TABLET SUBLINGUAL EVERY 5 MIN PRN
Qty: 25 TABLET | Refills: 1 | Status: SHIPPED | OUTPATIENT
Start: 2023-05-26

## 2023-05-26 NOTE — TELEPHONE ENCOUNTER
PT. HAS APPT. ON 6/5/23  Medication Refill    Medication needing refilled:NITROGLYCERIN    Dosage of the medication:0.4MG    How are you taking this medication (QD, BID, TID, QID, PRN):0.4 TABLET UNDER THE TONGUE EVERY 5MIN.  AS NEEDED    30 or 90 day supply called in:30DAYS    When will you run out of your medication:OUT    Which Pharmacy are we sending the medication to?:[facility] [address] [facility]armaine Parker[address], [address] [facility],[address] [address]   Phone:  [phone]  Fax:  [phone]

## 2023-10-05 RX ORDER — ISOSORBIDE MONONITRATE 60 MG/1
60 TABLET, EXTENDED RELEASE ORAL EVERY MORNING
Qty: 30 TABLET | Refills: 0 | Status: SHIPPED | OUTPATIENT
Start: 2023-10-05

## 2023-10-05 NOTE — TELEPHONE ENCOUNTER
Please call the patient to get scheduled for a follow up, he was supposed to f/u in 6 months per OV 12/5/2022. Will refill 30 days for now.  Thanks

## 2023-10-06 NOTE — TELEPHONE ENCOUNTER
Called spoke with patient states his insurance changed and isn't covered under [address] anymore. States he had to changed Cardiologist only due to insurance. Patient is seeing Cardiologist Dr Annetta Moscoso through [place].    States he got his refill through new Dr.

## 2024-01-10 ENCOUNTER — TELEPHONE (OUTPATIENT)
Dept: CARDIOLOGY CLINIC | Age: 69
End: 2024-01-10

## 2024-01-10 NOTE — TELEPHONE ENCOUNTER
Submitted PA for REPATHA  Via [facility] Key: [id number] STATUS: PENDING.    Follow up done daily; if no decision with in three days we will refax.  If another three days goes by with no decision will call the insurance for status.

## 2024-02-13 RX ORDER — ISOSORBIDE MONONITRATE 60 MG/1
60 TABLET, EXTENDED RELEASE ORAL EVERY MORNING
Qty: 90 TABLET | OUTPATIENT
Start: 2024-02-13

## 2024-02-13 RX ORDER — ISOSORBIDE MONONITRATE 60 MG/1
60 TABLET, EXTENDED RELEASE ORAL EVERY MORNING
Qty: 30 TABLET | Refills: 0 | Status: SHIPPED | OUTPATIENT
Start: 2024-02-13

## 2024-02-13 NOTE — TELEPHONE ENCOUNTER
Last OV: 12/5/22  Next OV: X  Last refill: 10/5/23  #30   Most recent Labs: 1/10/24 in care everywhere  Last EKG (if needed): 12/5/22    Called patient left message to return call to set up his 6 month follow up with Dr Horta.

## 2024-03-11 RX ORDER — CLOPIDOGREL BISULFATE 75 MG/1
75 TABLET ORAL EVERY MORNING
Qty: 90 TABLET | Refills: 3 | OUTPATIENT
Start: 2024-03-11

## 2024-03-11 NOTE — TELEPHONE ENCOUNTER
Called pt in regards to scheduling f/u and he states he does not follow up with our office because of his insurance.        
Fall with [facility]

## 2024-06-18 ENCOUNTER — TELEPHONE (OUTPATIENT)
Dept: ADMINISTRATIVE | Age: 69
End: 2024-06-18

## 2024-06-18 NOTE — TELEPHONE ENCOUNTER
Submitted PA for REPATHA  Via [facility] Key: [id number] STATUS: PENDING.    Follow up done daily; if no decision with in three days we will refax.  If another three days goes by with no decision will call the insurance for status.

## 2024-06-19 NOTE — TELEPHONE ENCOUNTER
LVM for patient regarding approval of PA for Repatha. Encouraged to reach out to pharmacy to make sure medication is affordable and to reach out to our office with any questions or concerns.

## 2024-09-06 RX ORDER — MEXILETINE HYDROCHLORIDE 150 MG/1
CAPSULE ORAL
Qty: 180 CAPSULE | Refills: 5 | OUTPATIENT
Start: 2024-09-06

## 2024-09-06 NOTE — TELEPHONE ENCOUNTER
Patient no longer follows with [facility], now follows with cardiologist Dr. Faye at [facility] (noted under encounter 9/22/2023.)  Refill refused.

## 2024-09-06 NOTE — TELEPHONE ENCOUNTER
Received refill request for Meziletine from [facility] pharmacy.    Last ov:2024 Faye    Last labs:2022    Last EK2022    Last Refill:2022    Next appointment:none

## (undated) DEVICE — AGENT HEMSTAT W4XL8IN OXIDIZED REGENERATED CELOS ABSRB

## (undated) DEVICE — SYRINGE MED 30ML STD CLR PLAS LUERLOCK TIP N CTRL DISP

## (undated) DEVICE — LOOP VES W1.3MM THK0.9MM MINI YEL SIL FLD REPELLENT

## (undated) DEVICE — SOLUTION IV IRRIG POUR BRL 0.9% SODIUM CHL 2F7124

## (undated) DEVICE — BASIC SINGLE BASIN 1-LF: Brand: MEDLINE INDUSTRIES, INC.

## (undated) DEVICE — APPLIER LIG CLP M L11IN TI STR RNG HNDL FOR 20 CLP DISP

## (undated) DEVICE — TOWEL,OR,DSP,ST,WHITE,DLX,XR,4/PK,20PK/C: Brand: MEDLINE

## (undated) DEVICE — Z DISCONTINUED USE 2272117 DRAPE SURG 3 QTR N INVASIVE 2 LAYR DISP

## (undated) DEVICE — SUTURE PROL SZ 5-0 L18IN NONABSORBABLE BLU L13MM PC-1 3/8 8618G

## (undated) DEVICE — SOLUTION IV 1000ML 0.9% SOD CHL PH 5 INJ USP VIAFLX PLAS

## (undated) DEVICE — SUTURE VCRL + SZ 3-0 L27IN ABSRB WHT CT-1 1/2 CIR VCP258H

## (undated) DEVICE — SHEATH INTRO 14FR L33CM OD5.3MM ID4.7MM HYDRPHLC KINK

## (undated) DEVICE — GLOVE ORTHO 7 1/2   MSG9475

## (undated) DEVICE — SUTURE PERMAHAND SZ 2-0 L30IN NONABSORBABLE BLK SILK W/O A305H

## (undated) DEVICE — SUTURE NONABSORBABLE MONOFILAMENT 5-0 C-1 1X24 IN PROLENE 8725H

## (undated) DEVICE — SUTURE PERMAHAND SZ 4-0 L12X30IN NONABSORBABLE BLK SILK A303H

## (undated) DEVICE — SPONGE LAP W18XL18IN WHT COT 4 PLY FLD STRUNG RADPQ DISP ST

## (undated) DEVICE — SYRINGE MED 50ML LUERLOCK TIP

## (undated) DEVICE — SUTURE NONABSORBABLE MONOFILAMENT 6-0 C-1 1X30 IN PROLENE 8706H

## (undated) DEVICE — STRIP,CLOSURE,WOUND,MEDI-STRIP,1/2X4: Brand: MEDLINE

## (undated) DEVICE — GAUZE,SPONGE,4"X4",16PLY,XRAY,STRL,LF: Brand: MEDLINE

## (undated) DEVICE — FORCEPS BX 240CM 2.4MM L NDL RAD JAW 4 M00513334

## (undated) DEVICE — PACK DRP UNIV W BK TBL MAYO STD BTM TOP SIDE UTIL CVR ZONE

## (undated) DEVICE — TOTAL TRAY, 16FR 10ML SIL FOLEY, URN: Brand: MEDLINE

## (undated) DEVICE — APPLIER CLP L9.375IN APER 2.1MM CLS L3.8MM 20 SM TI CLP

## (undated) DEVICE — 3M™ IOBAN™ 2 ANTIMICROBIAL INCISE DRAPE 6650EZ: Brand: IOBAN™ 2

## (undated) DEVICE — SUTURE VCRL + SZ 0 L27IN ABSRB UD CT-1 L36MM 1/2 CIR TAPR VCP260H

## (undated) DEVICE — SHEATH INTRO 16FR L33CM OD6.1MM ID5.3MM HYDRPHLC KINK

## (undated) DEVICE — CATHETER PTCA BLLN L4CM INFL 10-37MM CATH L90CM MOLDING

## (undated) DEVICE — MAJOR VASCULAR: Brand: MEDLINE INDUSTRIES, INC.

## (undated) DEVICE — 450 ML BOTTLE OF 0.05% CHLORHEXIDINE GLUCONATE IN 99.95% STERILE WATER FOR IRRIGATION, USP AND APPLICATOR.: Brand: IRRISEPT ANTIMICROBIAL WOUND LAVAGE

## (undated) DEVICE — C-ARM: Brand: UNBRANDED

## (undated) DEVICE — FOGARTY - HYDRAGRIP SURGICAL - CLAMP INSERTS: Brand: FOGARTY SOFTJAW

## (undated) DEVICE — INTENDED FOR TISSUE SEPARATION, AND OTHER PROCEDURES THAT REQUIRE A SHARP SURGICAL BLADE TO PUNCTURE OR CUT.: Brand: BARD-PARKER ® STAINLESS STEEL BLADES

## (undated) DEVICE — SUTURE PERMAHAND SZ 3-0 L30IN NONABSORBABLE BLK SILK BRAID A304H